# Patient Record
Sex: MALE | Race: WHITE | Employment: FULL TIME | URBAN - METROPOLITAN AREA
[De-identification: names, ages, dates, MRNs, and addresses within clinical notes are randomized per-mention and may not be internally consistent; named-entity substitution may affect disease eponyms.]

---

## 2018-02-05 ENCOUNTER — HOSPITAL ENCOUNTER (EMERGENCY)
Age: 63
Discharge: HOME | End: 2018-02-05
Attending: EMERGENCY MEDICINE | Admitting: EMERGENCY MEDICINE
Payer: OTHER MISCELLANEOUS

## 2018-02-05 VITALS
TEMPERATURE: 98 F | DIASTOLIC BLOOD PRESSURE: 91 MMHG | HEIGHT: 71 IN | RESPIRATION RATE: 18 BRPM | BODY MASS INDEX: 31.79 KG/M2 | OXYGEN SATURATION: 100 % | WEIGHT: 227.07 LBS | SYSTOLIC BLOOD PRESSURE: 162 MMHG | HEART RATE: 73 BPM

## 2018-02-05 DIAGNOSIS — S61.209A AVULSION, FINGER TIP, INITIAL ENCOUNTER: Primary | ICD-10-CM

## 2018-02-05 DIAGNOSIS — Z23 NEED FOR TETANUS BOOSTER: ICD-10-CM

## 2018-02-05 PROCEDURE — 99283 EMERGENCY DEPT VISIT LOW MDM: CPT

## 2018-02-05 PROCEDURE — 90715 TDAP VACCINE 7 YRS/> IM: CPT | Performed by: PHYSICIAN ASSISTANT

## 2018-02-05 PROCEDURE — 75810000293 HC SIMP/SUPERF WND  RPR

## 2018-02-05 PROCEDURE — 74011250636 HC RX REV CODE- 250/636: Performed by: PHYSICIAN ASSISTANT

## 2018-02-05 PROCEDURE — 90471 IMMUNIZATION ADMIN: CPT

## 2018-02-05 RX ORDER — METFORMIN HYDROCHLORIDE 500 MG/1
750 TABLET ORAL 2 TIMES DAILY WITH MEALS
COMMUNITY
End: 2020-01-07 | Stop reason: ALTCHOICE

## 2018-02-05 RX ORDER — LISINOPRIL 10 MG/1
TABLET ORAL DAILY
COMMUNITY
End: 2020-01-07

## 2018-02-05 RX ADMIN — TETANUS TOXOID, REDUCED DIPHTHERIA TOXOID AND ACELLULAR PERTUSSIS VACCINE, ADSORBED 0.5 ML: 5; 2.5; 8; 8; 2.5 SUSPENSION INTRAMUSCULAR at 09:34

## 2018-02-05 NOTE — ED NOTES
Pt reports he cut his left 5th finger at work this morning, bleeding controlled PTA, bandage in place

## 2018-02-05 NOTE — DISCHARGE INSTRUCTIONS
Fingertip Amputation: Care Instructions  Your Care Instructions  Fingertip amputation is a common injury. Treatment depends on how much skin, tissue, bone, and nail were damaged and how much of your finger or thumb was cut off. The doctor may have put stitches in your finger. You may need to see a hand surgeon for more treatment. Your fingertips have many nerves and are very sensitive, so the injury may be very painful. Recovery can take several weeks. Your finger may be sensitive to cold and painful for a year or more. You probably will have a splint to protect your finger as it heals. It is very important that you wear the splint exactly as your doctor tells you. Wearing a splint may interfere with your normal activities. Ask for help with daily tasks if you need it. The doctor has checked you carefully, but problems can develop later. If you notice any problems or new symptoms, get medical treatment right away. Follow-up care is a key part of your treatment and safety. Be sure to make and go to all appointments, and call your doctor if you are having problems. It's also a good idea to know your test results and keep a list of the medicines you take. How can you care for yourself at home? · If your doctor put a splint on your finger, wear the splint exactly as directed. Keep the splint dry. Do not remove it until your doctor says you can. · Keep the cut dry for the first 24 to 48 hours. After this, you can shower if your doctor says it is okay. Pat the cut dry. · Don't soak the cut, such as in a bathtub. Your doctor will tell you when it's safe to get the cut wet. · If your doctor told you how to care for your cut, follow your doctor's instructions. If you did not get instructions, follow this general advice:  ¨ After the first 24 to 48 hours, wash around the cut with clean water 2 times a day. Don't use hydrogen peroxide or alcohol, which can slow healing.   ¨ You may cover the cut with a thin layer of petroleum jelly, such as Vaseline, and a nonstick bandage. ¨ Apply more petroleum jelly and replace the bandage as needed. ¨ Prop up the sore hand on a pillow anytime you sit or lie down during the next 3 days. Try to keep it above the level of your heart. This will help reduce swelling. ¨ Avoid any activity that could cause your cut to reopen. ¨ Do not remove the stitches on your own. Your doctor will tell you when to come back to have the stitches removed. · Be safe with medicines. Take pain medicines exactly as directed. ¨ If the doctor gave you a prescription medicine for pain, take it as prescribed. ¨ If you are not taking a prescription pain medicine, ask your doctor if you can take an over-the-counter medicine. · If your doctor prescribed antibiotics, take them as directed. Do not stop taking them just because you feel better. You need to take the full course of antibiotics. When should you call for help? Call your doctor now or seek immediate medical care if:  ? · You have new pain, or your pain gets worse. ? · The skin near the wound is cool or pale or changes color. ? · The wound starts to bleed, and blood soaks through the bandage. Oozing small amounts of blood is normal.   ? · Your wound comes open. ? · You have symptoms of infection, such as:  ¨ Increased pain, swelling, warmth, or redness near the wound. ¨ Red streaks leading from the wound. ¨ Pus draining from the wound. ¨ A fever. ? Watch closely for changes in your health, and be sure to contact your doctor if:  ? · You do not get better as expected. Where can you learn more? Go to http://norma-ashley.info/. Enter [id number] [id number] in the search box to learn more about \"Fingertip Amputation: Care Instructions. \"  Current as of: March 21, 2017  Content Version: 11.4  © 5873-9507 [email or web address].  Care instructions adapted under license by [employer] (which disclaims liability or warranty for this information). If you have questions about a medical condition or this instruction, always ask your healthcare professional. [address] any warranty or liability for your use of this information. Tdap (Tetanus, Diphtheria, Pertussis) Vaccine: What You Need to Know  Why get vaccinated? Tetanus, diphtheria, and pertussis are very serious diseases. Tdap vaccine can protect us from these diseases. And Tdap vaccine given to pregnant women can protect  babies against pertussis. Tetanus (lockjaw) is rare in the [facility] today. It causes painful muscle tightening and stiffness, usually all over the body. · It can lead to tightening of muscles in the head and neck so you can't open your mouth, swallow, or sometimes even breathe. Tetanus kills about 1 out of 10 people who are infected even after receiving the best medical care. Diphtheria is also rare in the [place] today. It can cause a thick coating to form in the back of the throat. · It can lead to breathing problems, heart failure, paralysis, and death. Pertussis (whooping cough) causes severe coughing spells, which can cause difficulty breathing, vomiting, and disturbed sleep. · It can also lead to weight loss, incontinence, and rib fractures. Up to 2 in 100 adolescents and 5 in 100 adults with pertussis are hospitalized or have complications, which could include pneumonia or death. These diseases are caused by bacteria. Diphtheria and pertussis are spread from person to person through secretions from coughing or sneezing. Tetanus enters the body through cuts, scratches, or wounds. Before vaccines, as many as 200,000 cases of diphtheria, 200,000 cases of pertussis, and hundreds of cases of tetanus were reported in the [place] each year. Since vaccination began, reports of cases for tetanus and diphtheria have dropped by about 99% and for pertussis by about 80%.   Tdap vaccine  The Tdap vaccine can protect adolescents and adults from tetanus, diphtheria, and pertussis. One dose of Tdap is routinely given at age 6 or 15. People who did not get Tdap at that age should get it as soon as possible. Tdap is especially important for health care professionals and anyone having close contact with a baby younger than 12 months. Pregnant women should get a dose of Tdap during every pregnancy, to protect the  from pertussis. Infants are most at risk for severe, life-threatening complications from pertussis. Another vaccine, called Td, protects against tetanus and diphtheria, but not pertussis. A Td booster should be given every 10 years. Tdap may be given as one of these boosters if you have never gotten Tdap before. Tdap may also be given after a severe cut or burn to prevent tetanus infection. Your doctor or the person giving you the vaccine can give you more information. Tdap may safely be given at the same time as other vaccines. Some people should not get this vaccine  · A person who has ever had a life-threatening allergic reaction after a previous dose of any diphtheria-, tetanus-, or pertussis-containing vaccine, OR has a severe allergy to any part of this vaccine, should not get Tdap vaccine. Tell the person giving the vaccine about any severe allergies. · Anyone who had coma or long repeated seizures within 7 days after a childhood dose of DTP or DTaP, or a previous dose of Tdap, should not get Tdap, unless a cause other than the vaccine was found. They can still get Td. · Talk to your doctor if you:  ¨ Have seizures or another nervous system problem. ¨ Had severe pain or swelling after any vaccine containing diphtheria, tetanus, or pertussis. ¨ Ever had a condition called Guillain-Barré Syndrome (GBS). ¨ Aren't feeling well on the day the shot is scheduled. Risks  With any medicine, including vaccines, there is a chance of side effects. These are usually mild and go away on their own.  Serious reactions are also possible but are rare. Most people who get Tdap vaccine do not have any problems with it. Mild problems following Tdap  (Did not interfere with activities)  · Pain where the shot was given (about 3 in 4 adolescents or 2 in 3 adults)  · Redness or swelling where the shot was given (about 1 person in 5)  · Mild fever of at least 100.4°F (up to about 1 in 25 adolescents or 1 in 100 adults)  · Headache (about 3 or 4 people in 10)  · Tiredness (about 1 person in 3 or 4)  · Nausea, vomiting, diarrhea, stomachache (up to 1 in 4 adolescents or 1 in 10 adults)  · Chills, sore joints (about 1 person in 10)  · Body aches (about 1 person in 3 or 4)  · Rash, swollen glands (uncommon)  Moderate problems following Tdap  (Interfered with activities, but did not require medical attention)  · Pain where the shot was given (up to 1 in 5 or 6)  · Redness or swelling where the shot was given (up to about 1 in 16 adolescents or 1 in 12 adults)  · Fever over 102°F (about 1 in 100 adolescents or 1 in 250 adults)  · Headache (about 1 in 7 adolescents or 1 in 10 adults)  · Nausea, vomiting, diarrhea, stomachache (up to 1 to 3 people in 100)  · Swelling of the entire arm where the shot was given (up to about 1 in 500)  Severe problems following Tdap  (Unable to perform usual activities; required medical attention)  · Swelling, severe pain, bleeding and redness in the arm where the shot was given (rare)  Problems that could happen after any vaccine:  · People sometimes faint after a medical procedure, including vaccination. Sitting or lying down for about 15 minutes can help prevent fainting, and injuries caused by a fall. Tell your doctor if you feel dizzy or have vision changes or ringing in the ears. · Some people get severe pain in the shoulder and have difficulty moving the arm where a shot was given. This happens very rarely. · Any medication can cause a severe allergic reaction.  Such reactions from a vaccine are very rare, estimated at fewer than 1 in a million doses, and would happen within a few minutes to a few hours after the vaccination. As with any medicine, there is a very remote chance of a vaccine causing a serious injury or death. The safety of vaccines is always being monitored. For more information, visit: www.cdc.gov/vaccinesafety. What if there is a serious problem? What should I look for? · Look for anything that concerns you, such as signs of a severe allergic reaction, very high fever, or unusual behavior. Signs of a severe allergic reaction can include hives, swelling of the face and throat, difficulty breathing, a fast heartbeat, dizziness, and weakness. These would usually start a few minutes to a few hours after the vaccination. What should I do? · If you think it is a severe allergic reaction or other emergency that can't wait, call 9-1-1 or get the person to the nearest hospital. Otherwise, call your doctor. · Afterward, the reaction should be reported to the Vaccine Adverse Event Reporting System (VAERS). Your doctor might file this report, or you can do it yourself through the VAERS web site at www.vaers. [facility].gov, or by calling 1-819.775.7804. [employer] does not give medical advice. The National Vaccine Injury Compensation Program  The National Vaccine Injury Compensation Program (VICP) is a federal program that was created to compensate people who may have been injured by certain vaccines. Persons who believe they may have been injured by a vaccine can learn about the program and about filing a claim by calling 5-215.424.3562 or visiting the [employer] website at www.[facility].gov/vaccinecompensation. There is a time limit to file a claim for compensation. How can I learn more? · Ask your doctor. He or she can give you the vaccine package insert or suggest other sources of information. · Call your local or state health department.   · Contact the Centers for Disease Control and Prevention (CDC):  ¨ Call 4-905.480.2448 (1-800-CDC-INFO) or  ¨ Visit CDC's website at www.cdc.gov/vaccines  Vaccine Information Statement (Interim)  Tdap Vaccine  (2/24/15)  42 NEFTALI Tomas 251MD-75  Department of Health and Human Services  Centers for Disease Control and Prevention  Many Vaccine Information Statements are available in [place] and other languages. See www.immunize.org/vis. Muchas hojas de información sobre vacunas están disponibles en español y en otros idiomas. Visite www.immunize.org/vis. Care instructions adapted under license by [employer] (which disclaims liability or warranty for this information). If you have questions about a medical condition or this instruction, always ask your healthcare professional. [address] any warranty or liability for your use of this information.

## 2018-02-05 NOTE — ED PROVIDER NOTES
EMERGENCY DEPARTMENT HISTORY AND PHYSICAL EXAM      Date: 2/5/2018  Patient Name: Noelle Timmons    History of Presenting Illness     Chief Complaint   Patient presents with    Laceration     left pinky cut by fork lift this am at work       History Provided By: Patient    HPI: Noelle Timmons, 63 y.o. male with PMHx significant for DM, presents ambulatory to the ED with cc of skin avulsion to his L fifth finger s/p accidentally getting it caught in a forklift at work PTA. Pt arrives to the ED with the finger wrapped in a bandage. He rates current associated pain 3/10, describes as constant and achy, and denies modifying factors, associated symptoms, or taking medication for pain. Pt notes he is R-handed. He states his tetanus vaccine is not UTD. Pt specifically denies fever/chills, cough, cold symptoms, CP, SOB, abdominal pain, or rash. PCP: Jonnie Harper MD    There are no other complaints, changes, or physical findings at this time. Current Facility-Administered Medications   Medication Dose Route Frequency Provider Last Rate Last Dose    diph,Pertuss(AC),Tet Vac-PF (BOOSTRIX) suspension 0.5 mL  0.5 mL IntraMUSCular NOW [place], [place]         Current Outpatient Prescriptions   Medication Sig Dispense Refill    metFORMIN (GLUCOPHAGE) 500 mg tablet Take 750 mg by mouth two (2) times daily (with meals).  lisinopril (PRINIVIL, ZESTRIL) 10 mg tablet Take  by mouth daily. Past History     Past Medical History:  Past Medical History:   Diagnosis Date    Diabetes [facility])        Past Surgical History:  History reviewed. No pertinent surgical history. Family History:  History reviewed. No pertinent family history. Social History:  Social History   Substance Use Topics    Smoking status: Never Smoker    Smokeless tobacco: Never Used    Alcohol use Yes       Allergies:  No Known Allergies      Review of Systems   Review of Systems   Constitutional: Negative. Negative for chills and fever. HENT: Negative. Negative for congestion, rhinorrhea and sore throat. Respiratory: Negative. Negative for cough and shortness of breath. Cardiovascular: Negative. Negative for chest pain. Gastrointestinal: Negative. Negative for abdominal pain. Musculoskeletal:        See skin   Skin: Positive for wound (to L fifth finger). Negative for rash. All other systems reviewed and are negative. Physical Exam   Physical Exam   Constitutional: He is oriented to person, place, and time. He appears well-developed and well-nourished. No distress. Pleasant 62 yo  male   HENT:   Head: Normocephalic and atraumatic. Eyes: Conjunctivae are normal. Right eye exhibits no discharge. Left eye exhibits no discharge. Neck: Normal range of motion. Neck supple. Cardiovascular: Normal rate, normal heart sounds and intact distal pulses. No murmur heard. Pulmonary/Chest: Effort normal.   Musculoskeletal:   L 5th FINGER: Complete avulsion of skin from the DIP joint to the tip of the finger on the flexor surface. Venous bleeding. Clean. Distal NV intact. Cap refill < 2 sec. FROM of the finger. Neurological: He is alert and oriented to person, place, and time. Skin: Skin is warm and dry. He is not diaphoretic. Psychiatric: He has a normal mood and affect. His behavior is normal.   Nursing note and vitals reviewed. Medical Decision Making   I am the first provider for this patient. I reviewed the vital signs, available nursing notes, past medical history, past surgical history, family history and social history. Vital Signs-Reviewed the patient's vital signs. Patient Vitals for the past 12 hrs:   Temp Pulse Resp BP SpO2   02/05/18 0905 98 °F (36.7 °C) 73 18 (!) 162/91 100 %       Records Reviewed: Nursing Notes and Old Medical Records    Provider Notes (Medical Decision Making):   DDx: laceration, skin avulsion, need for tetanus. ED Course:   Initial assessment performed.  The patients presenting problems have been discussed, and they are in agreement with the care plan formulated and outlined with them. I have encouraged them to ask questions as they arise throughout their visit. Progress Note:  9:29 AM  Spoke with the nurse Florencio Partida) of Dr. Molina Saravia, hand surgeon. They will see the pt immediately after he leaves the ED today. Written by Pita Mckenna ED Scribe, as dictated by Tatiana Morris PA-C. Disposition:    DISCHARGE NOTE  9:32 AM  The patient has been re-evaluated and is ready for discharge. Reviewed available results with patient. Counseled pt on diagnosis and care plan. Pt has expressed understanding, and all questions have been answered. Pt agrees with plan and agrees to F/U as recommended, or return to the ED if their sxs worsen. Discharge instructions have been provided and explained to the pt, along with reasons to return to the ED. Written by Pita Mckenna ED Scribe, as dictated by Tatiana Morris PA-C. PLAN:  1. Current Discharge Medication List      CONTINUE these medications which have NOT CHANGED    Details   metFORMIN (GLUCOPHAGE) 500 mg tablet Take 750 mg by mouth two (2) times daily (with meals). lisinopril (PRINIVIL, ZESTRIL) 10 mg tablet Take  by mouth daily. 2.   Follow-up Information     Follow up With Details Comments Florin Bustamante MD Today  [address] [facility]  [address]  [address] [facility]  [phone]          Return to ED if worse     Diagnosis     Clinical Impression:   1. Avulsion, finger tip, initial encounter    2. Need for tetanus booster        Attestations: This note is prepared by Pita Mckenna, acting as Scribe for Tatiana Morris PA-C. Tatiana Morris PA-C: The scribe's documentation has been prepared under my direction and personally reviewed by me in its entirety.  I confirm that the note above accurately reflects all work, treatment, procedures, and medical decision making performed by me.

## 2020-01-07 ENCOUNTER — OFFICE VISIT (OUTPATIENT)
Dept: FAMILY MEDICINE CLINIC | Age: 65
End: 2020-01-07

## 2020-01-07 VITALS
HEIGHT: 71 IN | SYSTOLIC BLOOD PRESSURE: 133 MMHG | HEART RATE: 63 BPM | RESPIRATION RATE: 16 BRPM | WEIGHT: 217.6 LBS | BODY MASS INDEX: 30.46 KG/M2 | DIASTOLIC BLOOD PRESSURE: 83 MMHG | TEMPERATURE: 97.7 F | OXYGEN SATURATION: 95 %

## 2020-01-07 DIAGNOSIS — E78.2 MIXED HYPERLIPIDEMIA: ICD-10-CM

## 2020-01-07 DIAGNOSIS — I10 ESSENTIAL HYPERTENSION: ICD-10-CM

## 2020-01-07 DIAGNOSIS — E11.41 TYPE 2 DIABETES MELLITUS WITH DIABETIC MONONEUROPATHY, WITHOUT LONG-TERM CURRENT USE OF INSULIN (HCC): Primary | ICD-10-CM

## 2020-01-07 DIAGNOSIS — E11.41 DIABETIC MONONEUROPATHY ASSOCIATED WITH TYPE 2 DIABETES MELLITUS (HCC): ICD-10-CM

## 2020-01-07 RX ORDER — LISINOPRIL AND HYDROCHLOROTHIAZIDE 10; 12.5 MG/1; MG/1
1 TABLET ORAL 2 TIMES DAILY
COMMUNITY
Start: 2019-11-05 | End: 2020-02-05 | Stop reason: SDUPTHER

## 2020-01-07 RX ORDER — ATORVASTATIN CALCIUM 20 MG/1
20 TABLET, FILM COATED ORAL DAILY
COMMUNITY
Start: 2019-12-04 | End: 2021-12-28 | Stop reason: SDUPTHER

## 2020-01-07 RX ORDER — FISH OIL/DHA/EPA 1200-144MG
1200 CAPSULE ORAL DAILY
COMMUNITY

## 2020-01-07 RX ORDER — METFORMIN HYDROCHLORIDE 500 MG/1
500 TABLET, EXTENDED RELEASE ORAL 2 TIMES DAILY
Qty: 60 TAB | Refills: 11 | Status: SHIPPED | OUTPATIENT
Start: 2020-01-07 | End: 2020-09-10 | Stop reason: SDUPTHER

## 2020-01-07 RX ORDER — MULTIVITAMIN
500 TABLET ORAL DAILY
COMMUNITY

## 2020-01-07 RX ORDER — BLOOD SUGAR DIAGNOSTIC
STRIP MISCELLANEOUS AS DIRECTED
COMMUNITY
Start: 2019-10-14 | End: 2020-10-13 | Stop reason: SDUPTHER

## 2020-01-07 RX ORDER — GLIPIZIDE 10 MG/1
10 TABLET, FILM COATED, EXTENDED RELEASE ORAL DAILY
COMMUNITY
End: 2020-02-05 | Stop reason: SDUPTHER

## 2020-01-07 RX ORDER — GUAIFENESIN 100 MG/5ML
81 LIQUID (ML) ORAL DAILY
COMMUNITY

## 2020-01-07 RX ORDER — LANCETS
EACH MISCELLANEOUS AS DIRECTED
COMMUNITY
Start: 2019-10-19

## 2020-01-07 NOTE — PROGRESS NOTES
HPI  Simona Crystal is a 60 y.o. male who presents to [facility] care. Last seen by Dom Carey and [facility] 12/10/2019. Has hypertension that is well controlled. Has diabetes that is not at goal.  A1c was 9.0 in December, 9.56/2019 9.112/2018. He estimates that it has been higher than 7 for the last 3 years. He has had diabetes for 10 years. He can only tolerate 750 mg metformin daily in divided doses. He is taking glipizide 10 mg XR twice daily     PMHx:  Past Medical History:   Diagnosis Date    Diabetes ([address]     Hypertension        Meds:   Current Outpatient Medications   Medication Sig Dispense Refill    aspirin 81 mg chewable tablet Take 81 mg by mouth daily.  atorvastatin (LIPITOR) 20 mg tablet Take 20 mg by mouth daily.  ACCU-CHEK GUIDE strip as directed.  cinnamon bark 500 mg cap Take 500 mg by mouth daily.  glipiZIDE SR (GLUCOTROL XL) 10 mg CR tablet Take 10 mg by mouth daily.  ACCU-CHEK FASTCLIX LANCET DRUM misc as directed.  lisinopril-hydroCHLOROthiazide (PRINZIDE, ZESTORETIC) 10-12.5 mg per tablet Take 1 Tab by mouth two (2) times a day.  fish oil-dha-epa 1,200-144-216 mg cap Take 1,200 mg by mouth daily.  empagliflozin (JARDIANCE) 10 mg tablet Take 1 Tab by mouth daily. 90 Tab 1    metFORMIN ER (GLUCOPHAGE XR) 500 mg tablet Take 1 Tab by mouth two (2) times a day. 60 Tab 11       Allergies: Allergies   Allergen Reactions    Nsaids (Non-Steroidal Anti-Inflammatory Drug) Other (comments)    Penicillins Other (comments)       Smoker:  Social History     Tobacco Use   Smoking Status Never Smoker   Smokeless Tobacco Never Used       ETOH:   Social History     Substance and Sexual Activity   Alcohol Use Yes       FH:   Family History   Problem Relation Age of Onset    Lupus Mother        ROS:   As listed in HPI.  In addition:  Constitutional:   No headache, fever, fatigue, weight loss or weight gain      Cardiac:    No chest pain      Resp: No cough or shortness of breath      Neuro   No loss of consciousness, dizziness, seizures      Physical Exam:  Blood pressure 133/83, pulse 63, temperature 97.7 °F (36.5 °C), temperature source Oral, resp. rate 16, height 5' 11\" (1.803 m), weight 217 lb 9.6 oz (98.7 kg), SpO2 95 %. GEN: No apparent distress. Alert and oriented and responds to all questions appropriately. NEUROLOGIC:  No focal neurologic deficits. Strength and sensation grossly intact. Coordination and gait grossly intact. EXT: Well perfused. No edema. SKIN: No obvious rashes. Lungs clear to auscultation bilaterally  CV regular rate rhythm no murmur       Assessment and Plan     Diabetes, poorly controlled  Poor control for 3 years. Diabetes for 10 years. Last A1c 9.0 12/2019  Fasting blood sugar usually 130-140   currently taking metformin 750 mg daily due to GI side effects  Try extended release Metformin 500 mg twice daily ramping up 500 mg/week as tolerated  Continue glipizide XR 10 mg twice daily  Add Jardiance 10 mg  A1c in March    Diabetic neuropathy  Tingling in his toes  Not bothering him. No microalbuminuria when last checked    Cholesterol at goal based former PCP blood work, LDL was 25    Kidney function is fine, GFR 82    Health maintenance  Report last colonoscopy 1/1/2010 in records. Did not discuss with patient today  He gets annual eye exam    Follow-up (March) POC A1c only       ICD-10-CM ICD-9-CM    1. Type 2 diabetes mellitus with diabetic mononeuropathy, without long-term current use of insulin (HCC) E11.41 250.60 empagliflozin (JARDIANCE) 10 mg tablet     355.9 metFORMIN ER (GLUCOPHAGE XR) 500 mg tablet      REFERRAL TO DIABETIC EDUCATION   2. Essential hypertension I10 401.9    3. Diabetic mononeuropathy associated with type 2 diabetes mellitus (HCC) E11.41 250.60      355.9    4. Mixed hyperlipidemia E78.2 272.2        AVS given. Pt expressed understanding of instructions  This was a 45-minute visit.  [facility] than 50% of the time was spent counseling the patient on diabetes.

## 2020-01-07 NOTE — PROGRESS NOTES
Health Maintenance Due   Topic Date Due    Hepatitis C Screening  1955    Shingrix Vaccine Age 50> (1 of 2) 11/01/2007    FOBT Q 1 YEAR AGE 50-75  11/01/2007    Influenza Age 5 to Adult  08/01/2019

## 2020-02-04 ENCOUNTER — OFFICE VISIT (OUTPATIENT)
Dept: FAMILY MEDICINE CLINIC | Age: 65
End: 2020-02-04

## 2020-02-04 VITALS
OXYGEN SATURATION: 90 % | RESPIRATION RATE: 18 BRPM | WEIGHT: 210.8 LBS | HEART RATE: 86 BPM | TEMPERATURE: 98 F | DIASTOLIC BLOOD PRESSURE: 80 MMHG | SYSTOLIC BLOOD PRESSURE: 122 MMHG | HEIGHT: 71 IN | BODY MASS INDEX: 29.51 KG/M2

## 2020-02-04 DIAGNOSIS — R06.2 WHEEZE: ICD-10-CM

## 2020-02-04 DIAGNOSIS — J40 BRONCHITIS: Primary | ICD-10-CM

## 2020-02-04 DIAGNOSIS — J45.20 MILD INTERMITTENT ASTHMA WITHOUT COMPLICATION: ICD-10-CM

## 2020-02-04 DIAGNOSIS — E11.41 TYPE 2 DIABETES MELLITUS WITH DIABETIC MONONEUROPATHY, WITHOUT LONG-TERM CURRENT USE OF INSULIN (HCC): ICD-10-CM

## 2020-02-04 RX ORDER — NEBULIZER AND COMPRESSOR
1 EACH MISCELLANEOUS
Qty: 1 EACH | Refills: 0 | Status: SHIPPED | OUTPATIENT
Start: 2020-02-04 | End: 2022-06-28

## 2020-02-04 RX ORDER — ALBUTEROL SULFATE 0.83 MG/ML
2.5 SOLUTION RESPIRATORY (INHALATION)
Qty: 40 EACH | Refills: 2 | Status: SHIPPED | OUTPATIENT
Start: 2020-02-04 | End: 2022-06-28

## 2020-02-04 RX ORDER — ALBUTEROL SULFATE 0.83 MG/ML
2.5 SOLUTION RESPIRATORY (INHALATION) ONCE
Qty: 1 EACH | Refills: 0
Start: 2020-02-04 | End: 2020-02-04

## 2020-02-04 RX ORDER — BENZONATATE 200 MG/1
200 CAPSULE ORAL
Qty: 21 CAP | Refills: 1 | Status: SHIPPED | OUTPATIENT
Start: 2020-02-04 | End: 2020-02-11

## 2020-02-04 NOTE — PROGRESS NOTES
HPI  Astrid Colin is a 60 y.o. male who presents with congestion, cough. Started 7 days ago. ok sleep, drinking fluids. Other symptoms include chesty cough, nocturnal cough. Denies fever. Try Mucinex cold and flu with some relief. Denies MARTÍNEZ and S OB. He is finding did not take deep breaths because makes him cough    PMHx:  Past Medical History:   Diagnosis Date    Diabetes ([facility] [address]     Hypertension        Meds:   Current Outpatient Medications   Medication Sig Dispense Refill    albuterol (PROVENTIL VENTOLIN) 2.5 mg /3 mL (0.083 %) nebu 3 mL by Nebulization route once for 1 dose. 1 Each 0    aspirin 81 mg chewable tablet Take 81 mg by mouth daily.  atorvastatin (LIPITOR) 20 mg tablet Take 20 mg by mouth daily.  ACCU-CHEK GUIDE strip as directed.  cinnamon bark 500 mg cap Take 500 mg by mouth daily.  glipiZIDE SR (GLUCOTROL XL) 10 mg CR tablet Take 10 mg by mouth daily.  ACCU-CHEK FASTCLIX LANCET DRUM misc as directed.  lisinopril-hydroCHLOROthiazide (PRINZIDE, ZESTORETIC) 10-12.5 mg per tablet Take 1 Tab by mouth two (2) times a day.  fish oil-dha-epa 1,200-144-216 mg cap Take 1,200 mg by mouth daily.  empagliflozin (JARDIANCE) 10 mg tablet Take 1 Tab by mouth daily. 90 Tab 1    metFORMIN ER (GLUCOPHAGE XR) 500 mg tablet Take 1 Tab by mouth two (2) times a day. 60 Tab 11       Allergies: Allergies   Allergen Reactions    Nsaids (Non-Steroidal Anti-Inflammatory Drug) Other (comments)    Penicillins Other (comments)       Smoker:  Social History     Tobacco Use   Smoking Status Never Smoker   Smokeless Tobacco Never Used       ETOH:   Social History     Substance and Sexual Activity   Alcohol Use Yes       FH:   Family History   Problem Relation Age of Onset    Lupus Mother         ROS:  As listed in HPI.  In addition:  Constitutional:   No headache, fever, fatigue, weight loss or weight gain      Eyes:   No redness, pruritis, pain, visual changes, swelling, or discharge      Ears:    No pain, loss or changes in hearing     Cardiac:    No chest pain      Resp:   No shortness of breath or wheeze     Neuro   No loss of consciousness, dizziness, seizure    Physical Exam:  Blood pressure 122/80, pulse 86, temperature 98 °F (36.7 °C), temperature source Oral, resp. rate 18, height 5' 11\" (1.803 m), weight 210 lb 12.8 oz (95.6 kg), SpO2 90 %. GEN: No apparent distress. EYES:  Conjunctiva clear  EAR: TM are clear and without effusion. NOSE: Turbinates are congested  OROPHYARYNX: No erythema or tonsilar exudates  NECK:  Submandibular LAD. Non tender         LUNGS: Respirations unlabored; shallow respirations are unremarkable, deep inspirations revealed some opening snaps in the bases and a rhonchorous cough with end expiratory wheeze/rhonchi   CARDIOVASCULAR: Regular, rate, and rhythm  ABDOMEN: Soft; nontender;nl BS  SKIN: No obvious rashes. Assessment and Plan     Bronchitis  I think the findings in the base of his lungs are more likely to be due to shallow respirations than infection  Albuterol nebulizer in the office  provided significant relief and resolve the lower lung opening snaps. Clear to auscultation  Expect symptoms to last 10-14 days    Supportive care is best, provided a handout on available OTC remedies  Nasal steroid OTC for congestion  Honey in warm water for cough    Reports that Metformin XR does not give GI side effects  Reports that Guy Jonas has allowed his blood sugars to be closer to the 80s-100s for the last week    RTC if ssx worsen or fail to improve as expected      ICD-10-CM ICD-9-CM    1. Wheeze R06.2 786.07 ALBUTEROL, INHAL. SOL., FDA-APPROVED FINAL, NON-COMPOUND UNIT DOSE, 1 MG      INHAL RX, AIRWAY OBST/DX SPUTUM INDUCT       AVS given.  Pt expressed understanding of instructions

## 2020-02-04 NOTE — PROGRESS NOTES
1. Have you been to the ER, urgent care clinic since your last visit? Hospitalized since your last visit? No    2. Have you seen or consulted any other health care providers outside of the [address] [place] [address] since your last visit? Include any pap smears or colon screening.  No     Health Maintenance Due   Topic Date Due    Hepatitis C Screening  1955    Pneumococcal 0-64 years (1 of 1 - PPSV23) 11/01/1963    FOOT EXAM Q1  11/01/1967    MICROALBUMIN Q1  11/01/1967    EYE EXAM RETINAL OR DILATED  11/01/1967    Shingrix Vaccine Age 50> (1 of 2) 11/01/2007    FOBT Q 1 YEAR AGE 50-75  11/01/2007

## 2020-02-05 RX ORDER — GLIPIZIDE 10 MG/1
10 TABLET, FILM COATED, EXTENDED RELEASE ORAL DAILY
Qty: 90 TAB | Refills: 3 | Status: SHIPPED | OUTPATIENT
Start: 2020-02-05 | End: 2020-10-02

## 2020-02-05 RX ORDER — LISINOPRIL AND HYDROCHLOROTHIAZIDE 10; 12.5 MG/1; MG/1
1 TABLET ORAL 2 TIMES DAILY
Qty: 90 TAB | Refills: 3 | Status: SHIPPED | OUTPATIENT
Start: 2020-02-05 | End: 2020-02-07 | Stop reason: SDUPTHER

## 2020-02-05 NOTE — TELEPHONE ENCOUNTER
Requested Prescriptions     Pending Prescriptions Disp Refills    lisinopril-hydroCHLOROthiazide (PRINZIDE, ZESTORETIC) 10-12.5 mg per tablet       Sig: Take 1 Tab by mouth two (2) times a day.  glipiZIDE SR (GLUCOTROL XL) 10 mg CR tablet       Sig: Take 1 Tab by mouth daily. Patient's spouse mentions picking up two refills yesterday from [facility].   Metformin and jardiance are only 30 days from [facility]. They will pick these up today. Patient needs a new script for 90 day with refills for lisinopril and glipizide. Patient is requesting all new scripts for 90 days with refills through mail order.

## 2020-02-07 RX ORDER — LISINOPRIL AND HYDROCHLOROTHIAZIDE 10; 12.5 MG/1; MG/1
1 TABLET ORAL DAILY
Qty: 90 TAB | Refills: 3 | Status: SHIPPED | OUTPATIENT
Start: 2020-02-07 | End: 2021-01-11

## 2020-02-07 NOTE — TELEPHONE ENCOUNTER
Please check with patient to confirm dosing. Does he take lisinopril-hydrochlorothiazide once or twice a day?   If he is taking 2 pills per day pharmacy is suggesting we consolidate the medicine into 1 pill

## 2020-02-07 NOTE — TELEPHONE ENCOUNTER
[facility] (unknown area) sent over request for refill    Placed in Dr. Anabel Gonzalez box    Requested Prescriptions     Pending Prescriptions Disp Refills    lisinopril-hydroCHLOROthiazide (PRINZIDE, ZESTORETIC) 10-12.5 mg per tablet 90 Tab 3     Sig: Take 1 Tab by mouth two (2) times a day.

## 2020-02-10 ENCOUNTER — TELEPHONE (OUTPATIENT)
Dept: FAMILY MEDICINE CLINIC | Age: 65
End: 2020-02-10

## 2020-02-10 NOTE — TELEPHONE ENCOUNTER
Per Dr. Carolyn Flores, [facility] lisinopril-hctz 10-12.5 mg (1 tab) daily and received by [facility] ([facility]). Understanding verbalized.

## 2020-02-10 NOTE — TELEPHONE ENCOUNTER
# [phone]  Ref# [id number]  Caller is looking for clarification for a prescription. She has two scripts for lisinopril 12.5 mg. One is for twice a day and one is for once daily. Which one is correct?

## 2020-03-03 ENCOUNTER — OFFICE VISIT (OUTPATIENT)
Dept: DIABETES SERVICES | Age: 65
End: 2020-03-03

## 2020-03-03 DIAGNOSIS — E11.41 TYPE 2 DIABETES MELLITUS WITH DIABETIC MONONEUROPATHY, WITHOUT LONG-TERM CURRENT USE OF INSULIN (HCC): ICD-10-CM

## 2020-03-03 NOTE — LETTER
3/3/20 Patient: Mingo Valdez YOB: 1955 Date of Visit: 3/3/2020 Irma Mcallister MD 
[address] [facility] [address] VIA In Basket Dear Irma Mcallister MD, Thank you for referring Mr. Wilfrido Toribio to [address] [place], [place] [address]e. RDE for evaluation. My notes for this consultation are attached. If you have questions, please do not hesitate to call me. I look forward to following your patient along with you. Sincerely, Garrick Norwood RD

## 2020-03-03 NOTE — PROGRESS NOTES
Patient presented today for initial assessment for DSMES class series. He was diagnosed with T2DM 2 years ago and recently switched PCP to Dr. Prudencio Pate, who referred him to [facility]. MEDICATIONS: Patient reports compliance with glipizide, Jardiance, and metformin. He was recently started on Jardiance and notes BG has been much better controlled since starting this medication. Pt was under the impression that glipizide was to be taken BID. Advised him that per Rx in chart, he has been prescribed the XL version and should take a 10 mg tablet once daily. He took note of this information. Reports he was previously on regular release metformin and was unable to tolerate due to diarrhea, but since switching to XR version he is able to tolerate. MONITORING: Patient is using ACCU-CHEK Guide to check FBG daily in the AM. Readings range . He reports all readings have been <130 since starting Jardiance. Last A1C was 9.0 on 12/11/2019. MEALS: Patient notes he eats whole wheat and whole grain starches. He does eat 1-2 pieces of candy daily. He eats out for lunch every day, but typically eats a sandwich, sub, or salad. He goes out to eat once weekly. ACTIVITY: Patient has an active job repairing large appliances. On the weekends he spends his time doing house work and riding four wheelers. He does not currently exercise other than walking his 2 dogs up and down the driveway, but notes today that he is willing to work on increasing the amount of time/distance walking each day. PLAN: [facility] will contact patient to schedule DSMES class series. He is interested in attending classes at the [facility] due to proximity to his home. He is planning to attend a class for work at the end of April for 2 weeks and notes he would like to start DSMES class series in May if possible.

## 2020-03-03 NOTE — PROGRESS NOTES
Pt is seen today in shared appointment with Meredith White RN 1 [facility] - see her note for details of this appointment.      [address]

## 2020-04-03 ENCOUNTER — TELEPHONE (OUTPATIENT)
Dept: DIABETES SERVICES | Age: 65
End: 2020-04-03

## 2020-04-08 NOTE — TELEPHONE ENCOUNTER
Patient is due for DSMES Class 1. Contacted patient and reviewed the option to schedule a Telehealth visit to receive individual education due to the COVID-19 crisis. In person visits will tentatively resume around 6/10/20. Patient prefers to wait until a later date to schedule in person appointment. RN will contact patient at a later date with an update for in person visits.

## 2020-04-13 ENCOUNTER — VIRTUAL VISIT (OUTPATIENT)
Dept: FAMILY MEDICINE CLINIC | Age: 65
End: 2020-04-13

## 2020-04-13 VITALS — WEIGHT: 210 LBS | HEIGHT: 71 IN | BODY MASS INDEX: 29.4 KG/M2

## 2020-04-13 DIAGNOSIS — E78.2 MIXED HYPERLIPIDEMIA: ICD-10-CM

## 2020-04-13 DIAGNOSIS — I10 ESSENTIAL HYPERTENSION: ICD-10-CM

## 2020-04-13 DIAGNOSIS — E11.41 TYPE 2 DIABETES MELLITUS WITH DIABETIC MONONEUROPATHY, WITHOUT LONG-TERM CURRENT USE OF INSULIN (HCC): Primary | ICD-10-CM

## 2020-04-13 DIAGNOSIS — E11.41 DIABETIC MONONEUROPATHY ASSOCIATED WITH TYPE 2 DIABETES MELLITUS (HCC): ICD-10-CM

## 2020-04-13 NOTE — PROGRESS NOTES
NIKI Mancuso is a 60 y.o. male who presents to fu DM and HTN. Recently est care. Last seen by Tomas Cintron and [facility] 12/10/2019. Has a home blood pressure cuff but has not used it recently. Last objective few weeks ago 120/80    When he established care he reported that his diabetes was not at goal.  A1c was 9.0 December 2019 and 9.5 June 2019 and 9.1 December 2018. He was only able to tolerate 750 mg daily in divided doses. With this his fasting sugars have been in the 160s180s    We switched over to Metformin XR and now he can tolerate 1000 mg daily. Started him on Jardiance without issue. Reduced his glipizide 10 mg XR from twice daily down to daily. Now his fasting sugars are 901 10. He has never had a sugar below 88        PMHx:  Past Medical History:   Diagnosis Date    Diabetes ([address]     Hypertension        Meds:   Current Outpatient Medications   Medication Sig Dispense Refill    lisinopril-hydroCHLOROthiazide (PRINZIDE, ZESTORETIC) 10-12.5 mg per tablet Take 1 Tab by mouth daily. 90 Tab 3    glipiZIDE SR (GLUCOTROL XL) 10 mg CR tablet Take 1 Tab by mouth daily. 90 Tab 3    Nebulizer & Compressor machine 1 Each by Does Not Apply route every four (4) hours as needed for Wheezing. 1 Each 0    albuterol (PROVENTIL VENTOLIN) 2.5 mg /3 mL (0.083 %) nebu 3 mL by Nebulization route every four (4) hours as needed for Wheezing. 40 Each 2    empagliflozin (JARDIANCE) 10 mg tablet Take 1 Tab by mouth daily. 90 Tab 3    aspirin 81 mg chewable tablet Take 81 mg by mouth daily.  atorvastatin (LIPITOR) 20 mg tablet Take 20 mg by mouth daily.  ACCU-CHEK GUIDE strip as directed.  cinnamon bark 500 mg cap Take 500 mg by mouth daily.  ACCU-CHEK FASTCLIX LANCET DRUM misc as directed.  fish oil-dha-epa 1,200-144-216 mg cap Take 1,200 mg by mouth daily.  metFORMIN ER (GLUCOPHAGE XR) 500 mg tablet Take 1 Tab by mouth two (2) times a day.  60 Tab 11       Allergies: Allergies   Allergen Reactions    Nsaids (Non-Steroidal Anti-Inflammatory Drug) Other (comments)    Penicillins Other (comments)       Smoker:  Social History     Tobacco Use   Smoking Status Never Smoker   Smokeless Tobacco Never Used       ETOH:   Social History     Substance and Sexual Activity   Alcohol Use Yes       FH:   Family History   Problem Relation Age of Onset    Lupus Mother        ROS:   As listed in HPI. In addition:  Constitutional:   No headache, fever, fatigue, weight loss or weight gain      Cardiac:    No chest pain      Resp:   No cough or shortness of breath      Neuro   No loss of consciousness, dizziness, seizures      Physical Exam:  Height 5' 11\" (1.803 m), weight 210 lb (95.3 kg). GEN: No apparent distress. Alert and oriented and responds to all questions appropriately. NEUROLOGIC:  No focal neurologic deficits. Strength and sensation grossly intact. Coordination and gait grossly intact. EXT: Well perfused. No edema. SKIN: No obvious rashes. Assessment and Plan     Diabetes,   diagnosed 10 years ago  Has had poor control for the last 3 years  Now appears to be under better control  I would estimate his A1c to be in the mid 6. Fasting sugar 901 10    Metformin 500 mg XR twice daily  Glipizide 10 mg XR daily  Jardiance 10 mg daily    Continue current medication  If you develop low blood sugar in the next few months cut glipizide in half  A1c in July if practical    Diabetic neuropathy  Tingling in his toes  Not bothering him. No microalbuminuria when last checked    Cholesterol at goal based former PCP blood work, LDL was 25    Kidney function is fine, GFR 82    Hypertension  LisinoprilHCTZ 10 mg12.5 daily  Has a home cuff. Well controlled with home readings  Continue to spot check    Health maintenance  Report last colonoscopy 1/1/2010 in records.   Did not discuss with patient today  He gets annual eye exam    Follow-up (July) full panel including A1c ICD-10-CM ICD-9-CM    1. Type 2 diabetes mellitus with diabetic mononeuropathy, without long-term current use of insulin (HCC) E11.41 250.60      355.9    2. Diabetic mononeuropathy associated with type 2 diabetes mellitus (HCC) E11.41 250.60      355.9    3. Essential hypertension I10 401.9    4. Mixed hyperlipidemia E78.2 272.2        AVS given. Pt expressed understanding of instructions    THIS VISIT WAS COMPLETED VIRTUALLY USING DOXY. ME    Due to this being a TeleHealth evaluation, many elements of the physical examination are unable to be assessed. Pursuant to the emergency declaration under the [facility], [facility] waiver authority and the [employer] Act, this Virtual  Visit was conducted, with patient's consent, to reduce the patient's risk of exposure to COVID-19 and provide continuity of care for an established patient. Services were provided through a video synchronous discussion virtually to substitute for in-person clinic visit.

## 2020-04-13 NOTE — PROGRESS NOTES
Chief Complaint   Patient presents with    Hypertension     3 month follow-up      Health Maintenance reviewed     1. Have you been to the ER, urgent care clinic since your last visit? Hospitalized since your last visit? No     2. Have you seen or consulted any other health care providers outside of the [address] [place] [address] since your last visit? Include any pap smears or colon screening.   No

## 2020-09-10 ENCOUNTER — TELEPHONE (OUTPATIENT)
Dept: FAMILY MEDICINE CLINIC | Age: 65
End: 2020-09-10

## 2020-09-10 DIAGNOSIS — E11.41 TYPE 2 DIABETES MELLITUS WITH DIABETIC MONONEUROPATHY, WITHOUT LONG-TERM CURRENT USE OF INSULIN (HCC): ICD-10-CM

## 2020-09-10 RX ORDER — METFORMIN HYDROCHLORIDE 500 MG/1
500 TABLET, EXTENDED RELEASE ORAL 2 TIMES DAILY
Qty: 180 TAB | Refills: 3 | Status: SHIPPED | OUTPATIENT
Start: 2020-09-10 | End: 2020-12-11

## 2020-09-10 NOTE — TELEPHONE ENCOUNTER
Patient's wife Billy David is calling regarding the recall on medication.      Insurance is requesting Metformin hcr er 500 mg extended release    Needs to say [address]

## 2020-09-15 ENCOUNTER — TELEPHONE (OUTPATIENT)
Dept: FAMILY MEDICINE CLINIC | Age: 65
End: 2020-09-15

## 2020-09-15 NOTE — TELEPHONE ENCOUNTER
Labs were last done by former PCP in December. A1c at former PCP was 9.0 which is poor control. He established care in early January and we added Jardiance based on that lab result. That lab can only be done every 3 months so soonest we could have repeated labs would have been March but these were not done because of the pandemic    He appears to have much better control now based on his home sugar readings and it would be nice if we had a follow-up visit and blood work to confirm that    For the purposes of his form we may be able to use a lipid panel Dr Dominique Johnston on 12/2019:    Total cholesterol 105  Triglycerides 202  HDL 39  LDL 26

## 2020-09-15 NOTE — TELEPHONE ENCOUNTER
Called pt, verified name and . Informed pt that per Dr. Randle Head were last done by former PCP in December. A1c at former PCP was 9.0 which is poor control. He established care in early January and we added Jardiance based on that lab result. That lab can only be done every 3 months so soonest we could have repeated labs would have been March but these were not done because of the pandemic     He appears to have much better control now based on his home sugar readings and it would be nice if we had a follow-up visit and blood work to confirm that     For the purposes of his form we may be able to use a lipid panel Dr Preeti Mendoza on 2019: Total cholesterol 105  Triglycerides 202  HDL 39  LDL 26.    Pt stated understanding.

## 2020-09-15 NOTE — TELEPHONE ENCOUNTER
Called pts wife and she stating that she needs the lipid panel for insurance information. She would also like to know about his last A1C. I informed her that I do not see any labs resulted for patient. She feels like this is a miscommunication bc pts diabetic medication was changed and Dr. Luz Marina Musa informed him he had poor control, so she feels like he must have a done labs. Please advise.

## 2020-10-02 RX ORDER — GLIPIZIDE 10 MG/1
TABLET, FILM COATED, EXTENDED RELEASE ORAL
Qty: 90 TAB | Refills: 3 | Status: SHIPPED | OUTPATIENT
Start: 2020-10-02 | End: 2021-01-28 | Stop reason: SDUPTHER

## 2020-10-13 DIAGNOSIS — E11.41 TYPE 2 DIABETES MELLITUS WITH DIABETIC MONONEUROPATHY, WITHOUT LONG-TERM CURRENT USE OF INSULIN (HCC): Primary | ICD-10-CM

## 2020-10-13 RX ORDER — BLOOD SUGAR DIAGNOSTIC
STRIP MISCELLANEOUS
Qty: 50 STRIP | Refills: 11 | Status: SHIPPED | OUTPATIENT
Start: 2020-10-13

## 2020-10-13 RX ORDER — BLOOD SUGAR DIAGNOSTIC
STRIP MISCELLANEOUS
Qty: 100 STRIP | Refills: 11 | Status: SHIPPED | OUTPATIENT
Start: 2020-10-13 | End: 2021-09-28

## 2020-10-13 NOTE — TELEPHONE ENCOUNTER
Patient calling for test strip rx to be renewed.  Please call in box or 50 to rosana sanchez and the full amount to [employer]

## 2020-12-10 DIAGNOSIS — E11.41 TYPE 2 DIABETES MELLITUS WITH DIABETIC MONONEUROPATHY, WITHOUT LONG-TERM CURRENT USE OF INSULIN (HCC): ICD-10-CM

## 2020-12-11 RX ORDER — METFORMIN HYDROCHLORIDE 500 MG/1
TABLET, EXTENDED RELEASE ORAL
Qty: 180 TAB | Refills: 3 | Status: SHIPPED | OUTPATIENT
Start: 2020-12-11 | End: 2021-03-15 | Stop reason: SDUPTHER

## 2020-12-11 RX ORDER — EMPAGLIFLOZIN 10 MG/1
TABLET, FILM COATED ORAL
Qty: 90 TAB | Refills: 3 | Status: SHIPPED | OUTPATIENT
Start: 2020-12-11 | End: 2022-02-21 | Stop reason: ALTCHOICE

## 2021-01-11 RX ORDER — LISINOPRIL AND HYDROCHLOROTHIAZIDE 10; 12.5 MG/1; MG/1
TABLET ORAL
Qty: 90 TAB | Refills: 3 | Status: SHIPPED | OUTPATIENT
Start: 2021-01-11 | End: 2021-12-27

## 2021-01-28 RX ORDER — GLIPIZIDE 10 MG/1
TABLET, FILM COATED, EXTENDED RELEASE ORAL
Qty: 90 TAB | Refills: 1 | Status: SHIPPED | OUTPATIENT
Start: 2021-01-28 | End: 2021-03-15 | Stop reason: SDUPTHER

## 2021-01-28 NOTE — TELEPHONE ENCOUNTER
Requested Prescriptions     Pending Prescriptions Disp Refills    glipiZIDE SR (GLUCOTROL XL) 10 mg CR tablet 90 Tab 3

## 2021-03-15 ENCOUNTER — OFFICE VISIT (OUTPATIENT)
Dept: FAMILY MEDICINE CLINIC | Age: 66
End: 2021-03-15
Payer: COMMERCIAL

## 2021-03-15 VITALS
SYSTOLIC BLOOD PRESSURE: 121 MMHG | HEIGHT: 71 IN | RESPIRATION RATE: 17 BRPM | BODY MASS INDEX: 29.9 KG/M2 | OXYGEN SATURATION: 94 % | WEIGHT: 213.6 LBS | HEART RATE: 57 BPM | TEMPERATURE: 97 F | DIASTOLIC BLOOD PRESSURE: 75 MMHG

## 2021-03-15 DIAGNOSIS — I10 ESSENTIAL HYPERTENSION: ICD-10-CM

## 2021-03-15 DIAGNOSIS — Z00.00 ROUTINE GENERAL MEDICAL EXAMINATION AT A HEALTH CARE FACILITY: Primary | ICD-10-CM

## 2021-03-15 DIAGNOSIS — Z12.11 COLON CANCER SCREENING: ICD-10-CM

## 2021-03-15 DIAGNOSIS — E78.2 MIXED HYPERLIPIDEMIA: ICD-10-CM

## 2021-03-15 DIAGNOSIS — E11.41 TYPE 2 DIABETES MELLITUS WITH DIABETIC MONONEUROPATHY, WITHOUT LONG-TERM CURRENT USE OF INSULIN (HCC): ICD-10-CM

## 2021-03-15 DIAGNOSIS — E53.8 B12 DEFICIENCY: ICD-10-CM

## 2021-03-15 DIAGNOSIS — E11.41 DIABETIC MONONEUROPATHY ASSOCIATED WITH TYPE 2 DIABETES MELLITUS (HCC): ICD-10-CM

## 2021-03-15 DIAGNOSIS — E55.9 VITAMIN D DEFICIENCY: ICD-10-CM

## 2021-03-15 PROCEDURE — 99396 PREV VISIT EST AGE 40-64: CPT | Performed by: FAMILY MEDICINE

## 2021-03-15 RX ORDER — GLIPIZIDE 10 MG/1
TABLET, FILM COATED, EXTENDED RELEASE ORAL
Qty: 90 TAB | Refills: 3 | Status: SHIPPED | OUTPATIENT
Start: 2021-03-15 | End: 2022-04-11

## 2021-03-15 RX ORDER — METFORMIN HYDROCHLORIDE 500 MG/1
TABLET, EXTENDED RELEASE ORAL
Qty: 180 TAB | Refills: 3 | Status: SHIPPED | OUTPATIENT
Start: 2021-03-15 | End: 2022-05-04 | Stop reason: SDUPTHER

## 2021-03-15 NOTE — PROGRESS NOTES
1. Have you been to the ER, urgent care clinic since your last visit? Hospitalized since your last visit? No    2. Have you seen or consulted any other health care providers outside of the [address] [place] [address] since your last visit? Include any pap smears or colon screening.  No      Health Maintenance Due   Topic Date Due    Hepatitis C Screening  Never done    Pneumococcal 0-64 years (1 of 1 - PPSV23) Never done    Foot Exam Q1  Never done    A1C test (Diabetic or Prediabetic)  Never done    MICROALBUMIN Q1  Never done    COVID-19 Vaccine (1) Never done    Shingrix Vaccine Age 50> (1 of 2) Never done    Colorectal Cancer Screening Combo  Never done    Flu Vaccine (1) Never done    Lipid Screen  12/11/2020     Chief Complaint   Patient presents with    Diabetes

## 2021-03-15 NOTE — PROGRESS NOTES
HPI  Kathrynn Lesches is a 63 y.o. male who presents to follow-up on diabetes and hypertension. Recently establish care from [facility] and [facility]. Has a home blood pressure cuff feels blood pressures well controlled at home. When he established care he reported that his diabetes was not at goal.  A1c was 9.0 December 2019 and 9.5 June 2019 and 9.1 December 2018. He was only tolerating a small amount of Metformin because of GI side effects. We switched over to XR with good relief and added Jardiance and saw improvement in his fasting blood sugars    PMHx:  Past Medical History:   Diagnosis Date    Diabetes ([facility] [address]     Hypertension        Meds:   Current Outpatient Medications   Medication Sig Dispense Refill    glipiZIDE SR (GLUCOTROL XL) 10 mg CR tablet TAKE 1 TABLET DAILY 90 Tab 3    metFORMIN ER (GLUCOPHAGE XR) 500 mg tablet TAKE 1 TABLET BY MOUTH TWICE A  Tab 3    lisinopril-hydroCHLOROthiazide (PRINZIDE, ZESTORETIC) 10-12.5 mg per tablet TAKE 1 TABLET DAILY 90 Tab 3    Jardiance 10 mg tablet TAKE 1 TABLET BY MOUTH EVERY DAY 90 Tab 3    glucose blood VI test strips (Accu-Chek Guide test strips) strip Use to check blood sugar once daily 100 Strip 11    glucose blood VI test strips (Accu-Chek Guide test strips) strip Use to check blood sugar once daily 50 Strip 11    Nebulizer & Compressor machine 1 Each by Does Not Apply route every four (4) hours as needed for Wheezing. 1 Each 0    albuterol (PROVENTIL VENTOLIN) 2.5 mg /3 mL (0.083 %) nebu 3 mL by Nebulization route every four (4) hours as needed for Wheezing. 40 Each 2    aspirin 81 mg chewable tablet Take 81 mg by mouth daily.  atorvastatin (LIPITOR) 20 mg tablet Take 20 mg by mouth daily.  cinnamon bark 500 mg cap Take 500 mg by mouth daily.  ACCU-CHEK FASTCLIX LANCET DRUM misc as directed.  fish oil-dha-epa 1,200-144-216 mg cap Take 1,200 mg by mouth daily. Allergies:    Allergies   Allergen Reactions  Nsaids (Non-Steroidal Anti-Inflammatory Drug) Other (comments)    Penicillins Other (comments)       Smoker:  Social History     Tobacco Use   Smoking Status Never Smoker   Smokeless Tobacco Never Used       ETOH:   Social History     Substance and Sexual Activity   Alcohol Use Yes       FH:   Family History   Problem Relation Age of Onset    Lupus Mother        ROS:   As listed in HPI. In addition:  Constitutional:   No headache, fever, fatigue, weight loss or weight gain      Cardiac:    No chest pain      Resp:   No cough or shortness of breath      Neuro   No loss of consciousness, dizziness, seizures      Physical Exam:  Blood pressure 121/75, pulse (!) 57, temperature 97 °F (36.1 °C), temperature source Temporal, resp. rate 17, height 5' 11\" (1.803 m), weight 213 lb 9.6 oz (96.9 kg), SpO2 94 %. GEN: No apparent distress. Alert and oriented and responds to all questions appropriately. NEUROLOGIC:  No focal neurologic deficits. Strength and sensation grossly intact. Coordination and gait grossly intact. EXT: Well perfused. No edema. SKIN: No obvious rashes. Lungs clear to auscultation bilaterally  CV regular rate rhythm no murmur  HEENT external canals are devoid of wax and look a little irritated but are not bothering him. Tympanic membrane's are little foggy bilaterally with no obvious effusion    Diabetic foot exam.  Good pulse, normal nails, intact skin. Tips of the big toe and second toe have reduced/no sensation to microfiber but the rest of the toe appears to be intact and other toes intact       Assessment and Plan     Diabetes,   diagnosed 10 years ago  Has had poor control for the last 3 years  Now appears to be under better control  I would estimate his A1c to be in the mid 6.   Fasting sugar 90-1 10     Metformin 500 mg XR twice daily  Glipizide 10 mg XR daily  Jardiance 10 mg daily     Continue current medication  If you develop low blood sugar in the next few months cut glipizide in half    Fast food every day for lunch. If his A1c is higher than his fasting sugars would suggest then he would like to work on diet and recheck in 3 months rather than medication change. Consider Jardiance dose increase     Diabetic neuropathy  Tingling in his toes. Just the tips. Does not feel he needs medication  Check B12 just in case    No microalbuminuria when last checked. Check today  Already taking lisinopril     Cholesterol at goal based former PCP blood work, LDL was 25     Kidney function is fine, GFR 82     Hypertension  Lisinopril-HCTZ 10 mg-12.5 daily  Has a home cuff. Well controlled with home readings  Continue to spot check     Health maintenance  Report last colonoscopy 1/1/2010 in records. Refer  He gets annual eye exam    Gets [facility] labs  3-6-month follow-up pending labs      ICD-10-CM ICD-9-CM    1. Routine general medical examination at a health care facility  Z00.00 V70.0 VITAMIN D, 25 HYDROXY      LIPID PANEL      CBC WITH AUTOMATED DIFF      METABOLIC PANEL, COMPREHENSIVE      HEMOGLOBIN A1C WITH EAG      VITAMIN B12 & FOLATE      VITAMIN D, 25 HYROXY PANEL      CANCELED: LIPID PANEL      CANCELED: CBC WITH AUTOMATED DIFF      CANCELED: METABOLIC PANEL, COMPREHENSIVE      CANCELED: HEMOGLOBIN A1C WITH EAG      CANCELED: VITAMIN B12 & FOLATE   2. Essential hypertension  I10 401.9 LIPID PANEL      CBC WITH AUTOMATED DIFF      METABOLIC PANEL, COMPREHENSIVE      CANCELED: LIPID PANEL      CANCELED: CBC WITH AUTOMATED DIFF      CANCELED: METABOLIC PANEL, COMPREHENSIVE   3. Diabetic mononeuropathy associated with type 2 diabetes mellitus (HCC)  E11.41 250.60 VITAMIN D, 25 HYDROXY     355.9 CANCELED: VITAMIN B12 & FOLATE   4.  Type 2 diabetes mellitus with diabetic mononeuropathy, without long-term current use of insulin ([facility])  E11.41 250.60  DIABETES FOOT EXAM     355.9 AMB POC URINE, MICROALBUMIN, SEMIQUANT (3 RESULTS)      HEMOGLOBIN A1C WITH EAG      metFORMIN ER (GLUCOPHAGE XR) 500 mg tablet      CANCELED: HEMOGLOBIN A1C WITH EAG   5. Mixed hyperlipidemia  E78.2 272.2    6. Colon cancer screening  Z12.11 V76.51 REFERRAL FOR COLONOSCOPY   7. B12 deficiency  E53.8 266.2 VITAMIN B12 & FOLATE      CANCELED: VITAMIN B12 & FOLATE   8. Vitamin D deficiency  E55.9 268.9 VITAMIN D, 25 HYDROXY      VITAMIN D, 25 HYROXY PANEL       AVS given.  Pt expressed understanding of instructions

## 2021-03-18 ENCOUNTER — IMMUNIZATION (OUTPATIENT)
Dept: INTERNAL MEDICINE CLINIC | Age: 66
End: 2021-03-18
Payer: COMMERCIAL

## 2021-03-18 DIAGNOSIS — Z23 ENCOUNTER FOR IMMUNIZATION: Primary | ICD-10-CM

## 2021-03-18 PROCEDURE — 0001A COVID-19, MRNA, LNP-S, PF, 30MCG/0.3ML DOSE(PFIZER): CPT | Performed by: FAMILY MEDICINE

## 2021-03-18 PROCEDURE — 91300 COVID-19, MRNA, LNP-S, PF, 30MCG/0.3ML DOSE(PFIZER): CPT | Performed by: FAMILY MEDICINE

## 2021-04-08 ENCOUNTER — IMMUNIZATION (OUTPATIENT)
Dept: INTERNAL MEDICINE CLINIC | Age: 66
End: 2021-04-08
Payer: COMMERCIAL

## 2021-04-08 DIAGNOSIS — Z23 ENCOUNTER FOR IMMUNIZATION: Primary | ICD-10-CM

## 2021-04-08 PROCEDURE — 0002A COVID-19, MRNA, LNP-S, PF, 30MCG/0.3ML DOSE(PFIZER): CPT | Performed by: FAMILY MEDICINE

## 2021-04-08 PROCEDURE — 91300 COVID-19, MRNA, LNP-S, PF, 30MCG/0.3ML DOSE(PFIZER): CPT | Performed by: FAMILY MEDICINE

## 2021-04-16 LAB
25(OH)D3+25(OH)D2 SERPL-MCNC: 29.2 NG/ML (ref 30–100)
ALBUMIN SERPL-MCNC: 4.4 G/DL (ref 3.8–4.8)
ALBUMIN/GLOB SERPL: 1.6 {RATIO} (ref 1.2–2.2)
ALP SERPL-CCNC: 91 IU/L (ref 39–117)
ALT SERPL-CCNC: 21 IU/L (ref 0–44)
AST SERPL-CCNC: 14 IU/L (ref 0–40)
BASOPHILS # BLD AUTO: 0.1 X10E3/UL (ref 0–0.2)
BASOPHILS NFR BLD AUTO: 1 %
BILIRUB SERPL-MCNC: 0.3 MG/DL (ref 0–1.2)
BUN SERPL-MCNC: 16 MG/DL (ref 8–27)
BUN/CREAT SERPL: 16 (ref 10–24)
CALCIUM SERPL-MCNC: 9.7 MG/DL (ref 8.6–10.2)
CHLORIDE SERPL-SCNC: 101 MMOL/L (ref 96–106)
CHOLEST SERPL-MCNC: 122 MG/DL (ref 100–199)
CO2 SERPL-SCNC: 24 MMOL/L (ref 20–29)
CREAT SERPL-MCNC: 1.02 MG/DL (ref 0.76–1.27)
EOSINOPHIL # BLD AUTO: 0.2 X10E3/UL (ref 0–0.4)
EOSINOPHIL NFR BLD AUTO: 3 %
ERYTHROCYTE [DISTWIDTH] IN BLOOD BY AUTOMATED COUNT: 13.3 % (ref 11.6–15.4)
EST. AVERAGE GLUCOSE BLD GHB EST-MCNC: 183 MG/DL
FOLATE SERPL-MCNC: 19.4 NG/ML
GLOBULIN SER CALC-MCNC: 2.8 G/DL (ref 1.5–4.5)
GLUCOSE SERPL-MCNC: 202 MG/DL (ref 65–99)
HBA1C MFR BLD: 8 % (ref 4.8–5.6)
HCT VFR BLD AUTO: 48.8 % (ref 37.5–51)
HDLC SERPL-MCNC: 41 MG/DL
HGB BLD-MCNC: 15.8 G/DL (ref 13–17.7)
IMM GRANULOCYTES # BLD AUTO: 0 X10E3/UL (ref 0–0.1)
IMM GRANULOCYTES NFR BLD AUTO: 0 %
IMP & REVIEW OF LAB RESULTS: NORMAL
LDLC SERPL CALC-MCNC: 41 MG/DL (ref 0–99)
LYMPHOCYTES # BLD AUTO: 1.9 X10E3/UL (ref 0.7–3.1)
LYMPHOCYTES NFR BLD AUTO: 35 %
MCH RBC QN AUTO: 27.8 PG (ref 26.6–33)
MCHC RBC AUTO-ENTMCNC: 32.4 G/DL (ref 31.5–35.7)
MCV RBC AUTO: 86 FL (ref 79–97)
MONOCYTES # BLD AUTO: 0.5 X10E3/UL (ref 0.1–0.9)
MONOCYTES NFR BLD AUTO: 8 %
NEUTROPHILS # BLD AUTO: 2.9 X10E3/UL (ref 1.4–7)
NEUTROPHILS NFR BLD AUTO: 53 %
PLATELET # BLD AUTO: 215 X10E3/UL (ref 150–450)
POTASSIUM SERPL-SCNC: 4.9 MMOL/L (ref 3.5–5.2)
PROT SERPL-MCNC: 7.2 G/DL (ref 6–8.5)
RBC # BLD AUTO: 5.68 X10E6/UL (ref 4.14–5.8)
SODIUM SERPL-SCNC: 137 MMOL/L (ref 134–144)
TRIGL SERPL-MCNC: 262 MG/DL (ref 0–149)
VIT B12 SERPL-MCNC: 557 PG/ML (ref 232–1245)
VLDLC SERPL CALC-MCNC: 40 MG/DL (ref 5–40)
WBC # BLD AUTO: 5.5 X10E3/UL (ref 3.4–10.8)

## 2021-04-20 DIAGNOSIS — E11.41 TYPE 2 DIABETES MELLITUS WITH DIABETIC MONONEUROPATHY, WITHOUT LONG-TERM CURRENT USE OF INSULIN (HCC): Primary | ICD-10-CM

## 2021-04-20 NOTE — TELEPHONE ENCOUNTER
Labs reviewed. They look good for the most part but A1c is 8.0 indicating an average blood sugar of 180. This is different than the numbers you have been getting on your home meter. If your meter is older than 1 year it might have become inaccurate. It is also possible that your diet during the day is driving your sugar up. You had wanted to try to work on your diet.   If you also want to try a medication change I suggest we increase Jardiance to its full dose 25 mg    Follow-up 3 months

## 2021-09-25 DIAGNOSIS — E11.41 TYPE 2 DIABETES MELLITUS WITH DIABETIC MONONEUROPATHY, WITHOUT LONG-TERM CURRENT USE OF INSULIN (HCC): ICD-10-CM

## 2021-09-28 RX ORDER — BLOOD SUGAR DIAGNOSTIC
STRIP MISCELLANEOUS
Qty: 100 STRIP | Refills: 3 | Status: SHIPPED | OUTPATIENT
Start: 2021-09-28

## 2021-12-27 RX ORDER — LISINOPRIL AND HYDROCHLOROTHIAZIDE 10; 12.5 MG/1; MG/1
TABLET ORAL
Qty: 90 TABLET | Refills: 3 | Status: SHIPPED | OUTPATIENT
Start: 2021-12-27 | End: 2021-12-28 | Stop reason: SDUPTHER

## 2021-12-28 DIAGNOSIS — E11.41 TYPE 2 DIABETES MELLITUS WITH DIABETIC MONONEUROPATHY, WITHOUT LONG-TERM CURRENT USE OF INSULIN (HCC): ICD-10-CM

## 2021-12-28 RX ORDER — LISINOPRIL AND HYDROCHLOROTHIAZIDE 10; 12.5 MG/1; MG/1
TABLET ORAL
Qty: 90 TABLET | Refills: 3 | Status: SHIPPED | OUTPATIENT
Start: 2021-12-28 | End: 2022-05-04 | Stop reason: SDUPTHER

## 2021-12-28 RX ORDER — ATORVASTATIN CALCIUM 20 MG/1
20 TABLET, FILM COATED ORAL DAILY
Qty: 90 TABLET | Refills: 3 | Status: SHIPPED | OUTPATIENT
Start: 2021-12-28 | End: 2022-05-04 | Stop reason: SDUPTHER

## 2022-02-15 ENCOUNTER — TELEPHONE (OUTPATIENT)
Dept: FAMILY MEDICINE CLINIC | Age: 67
End: 2022-02-15

## 2022-02-15 ENCOUNTER — NURSE TRIAGE (OUTPATIENT)
Dept: OTHER | Facility: CLINIC | Age: 67
End: 2022-02-15

## 2022-02-15 NOTE — TELEPHONE ENCOUNTER
Mrs Campbell Mulligan called on behalf of Mr. Campbell Mulligan who is having severe, left knee pain. This has been going on for the last 2 weeks, wants to be seen asap.

## 2022-02-15 NOTE — TELEPHONE ENCOUNTER
Pt scheduled for Monday.  Wife states he has been dealing with this pain for 2 weeks now he will be fine until Monday

## 2022-02-15 NOTE — TELEPHONE ENCOUNTER
LIMITED TRIAGE DUE TO PATIENT NOT PRESENT FOR THE CALL    Reason for Disposition   MODERATE pain (e.g., symptoms interfere with work or school, limping) and present > 3 days    Protocols used: KNEE PAIN-ADULT-[place]    Received call from [address] [place] [address] at [facility] with [employer]. Subjective: Caller states \"My  needs an appointment to have his knee looked at\"     Current Symptoms: Caller reports her  has experienced L knee pain for the past 2 weeks. Denies injury, swelling or bruising. Wife has noticed that her  has been limping    Onset: 2 weeks    Associated Symptoms: Difficulty walking    Pain Severity: Moderate pain    Temperature: Denies fever    What has been tried: Aspirin    LMP: NA Pregnant: NA    Recommended disposition: See in 3 days    Care advice provided, patient verbalizes understanding; denies any other questions or concerns; instructed to call back for any new or worsening symptoms. Attention Provider: Thank you for allowing me to participate in the care of your patient. The patient was connected to triage in response to information provided to the [facility]. Please do not respond through this encounter as the response is not directed to a shared pool.

## 2022-02-21 ENCOUNTER — OFFICE VISIT (OUTPATIENT)
Dept: FAMILY MEDICINE CLINIC | Age: 67
End: 2022-02-21
Payer: COMMERCIAL

## 2022-02-21 VITALS
OXYGEN SATURATION: 95 % | HEART RATE: 69 BPM | WEIGHT: 209.8 LBS | DIASTOLIC BLOOD PRESSURE: 83 MMHG | HEIGHT: 71 IN | RESPIRATION RATE: 16 BRPM | TEMPERATURE: 98.1 F | SYSTOLIC BLOOD PRESSURE: 126 MMHG | BODY MASS INDEX: 29.37 KG/M2

## 2022-02-21 DIAGNOSIS — G89.29 CHRONIC PAIN OF LEFT KNEE: Primary | ICD-10-CM

## 2022-02-21 DIAGNOSIS — E78.2 MIXED HYPERLIPIDEMIA: ICD-10-CM

## 2022-02-21 DIAGNOSIS — E11.41 DIABETIC MONONEUROPATHY ASSOCIATED WITH TYPE 2 DIABETES MELLITUS (HCC): ICD-10-CM

## 2022-02-21 DIAGNOSIS — I10 ESSENTIAL HYPERTENSION: ICD-10-CM

## 2022-02-21 DIAGNOSIS — M25.562 CHRONIC PAIN OF LEFT KNEE: Primary | ICD-10-CM

## 2022-02-21 PROCEDURE — 99214 OFFICE O/P EST MOD 30 MIN: CPT | Performed by: FAMILY MEDICINE

## 2022-02-21 RX ORDER — PREDNISONE 5 MG/1
TABLET ORAL
Qty: 21 TABLET | Refills: 0 | Status: SHIPPED | OUTPATIENT
Start: 2022-02-21 | End: 2022-06-28 | Stop reason: ALTCHOICE

## 2022-02-21 NOTE — PROGRESS NOTES
1. Have you been to the ER, urgent care clinic since your last visit? Hospitalized since your last visit? No    2. Have you seen or consulted any other health care providers outside of the [address] [place] [address] since your last visit? Include any pap smears or colon screening.  No    Health Maintenance Due   Topic Date Due    Hepatitis C Screening  Never done    Pneumococcal 0-64 years (1 of 2 - PPSV23) Never done    MICROALBUMIN Q1  Never done    Colorectal Cancer Screening Combo  Never done    Shingrix Vaccine Age 50> (1 of 2) Never done    Flu Vaccine (1) Never done    COVID-19 Vaccine (3 - Booster for Pfizer series) 09/08/2021    Foot Exam Q1  03/15/2022     Chief Complaint   Patient presents with    Knee Pain     Pt states about 2 weeks ago he pulled a muscle in (L) leg, now he has this shooting pain to his knee

## 2022-02-21 NOTE — PROGRESS NOTES
Chief Complaint   Patient presents with    Knee Pain     Pt states about 2 weeks ago he pulled a muscle in (L) leg, now he has this shooting pain to his knee     Pt reports that starting 2 weeks ago he developed tightness in the back of his left calf, shot up to the knee. Pt has pain with extension of the leg, fullness of the knee, does not feel right. Pt is not able to fully extend his lower leg. Pt has an active job, service job, does a lot of kneeling, active work. No known injury. No swelling noticed. Subjective: (As above and below)     Chief Complaint   Patient presents with    Knee Pain     Pt states about 2 weeks ago he pulled a muscle in (L) leg, now he has this shooting pain to his knee     he is a 61y.o. year old male who presents for evaluation. Reviewed PmHx, RxHx, FmHx, SocHx, AllgHx and updated in chart. Review of Systems - negative except as listed above    Objective:     Vitals:    02/21/22 1043   BP: 126/83   Pulse: 69   Resp: 16   Temp: 98.1 °F (36.7 °C)   TempSrc: Oral   SpO2: 95%   Weight: 209 lb 12.8 oz (95.2 kg)   Height: 5' 11\" (1.803 m)     Physical Examination: General appearance - alert, well appearing, and in no distress  Mental status - normal mood, behavior, speech, dress, motor activity, and thought processes  Ears - bilateral TM's and external ear canals normal  Chest - clear to auscultation, no wheezes, rales or rhonchi, symmetric air entry  Heart - normal rate, regular rhythm, normal S1, S2, no murmurs, rubs, clicks or gallops  Musculoskeletal - pain with full tension of left leg, unable to fully passively extend, no effusion of knee, no bakers cyst palpated  Extremities - peripheral pulses normal, no pedal edema, no clubbing or cyanosis    Assessment/ Plan:   1. Diabetic mononeuropathy associated with type 2 diabetes mellitus ([address]  -check labs  - HEMOGLOBIN A1C WITH EAG; Future    2.  Chronic pain of left knee  -trial of prednisone, will x-ray  If not improving   - URIC ACID; Future  - predniSONE (STERAPRED) 5 mg dose pack; See administration instruction per 5mg dose pack  Dispense: 21 Tablet; Refill: 0  - XR KNEE LT MAX 2 VWS; Future    3. Mixed hyperlipidemia  - METABOLIC PANEL, COMPREHENSIVE; Future  - LIPID PANEL; Future    4. Essential hypertension  -well controlled  - CBC W/O DIFF; Future  - TSH 3RD GENERATION; Future       I have discussed the diagnosis with the patient and the intended plan as seen in the above orders. The patient has received an after-visit summary and questions were answered concerning future plans.      Medication Side Effects and Warnings were discussed with patient: yes  Patient Labs were reviewed: yes  Patient Past Records were reviewed:  yes    Estuardo Hu M.D.

## 2022-02-22 LAB
ALBUMIN SERPL-MCNC: 4.1 G/DL (ref 3.5–5)
ALBUMIN/GLOB SERPL: 1.1 {RATIO} (ref 1.1–2.2)
ALP SERPL-CCNC: 108 U/L (ref 45–117)
ALT SERPL-CCNC: 35 U/L (ref 12–78)
ANION GAP SERPL CALC-SCNC: 6 MMOL/L (ref 5–15)
AST SERPL-CCNC: 14 U/L (ref 15–37)
BILIRUB SERPL-MCNC: 0.5 MG/DL (ref 0.2–1)
BUN SERPL-MCNC: 19 MG/DL (ref 6–20)
BUN/CREAT SERPL: 20 (ref 12–20)
CALCIUM SERPL-MCNC: 9.7 MG/DL (ref 8.5–10.1)
CHLORIDE SERPL-SCNC: 100 MMOL/L (ref 97–108)
CHOLEST SERPL-MCNC: 140 MG/DL
CO2 SERPL-SCNC: 28 MMOL/L (ref 21–32)
COMMENT, HOLDF: NORMAL
CREAT SERPL-MCNC: 0.97 MG/DL (ref 0.7–1.3)
ERYTHROCYTE [DISTWIDTH] IN BLOOD BY AUTOMATED COUNT: 13.2 % (ref 11.5–14.5)
EST. AVERAGE GLUCOSE BLD GHB EST-MCNC: 200 MG/DL
GLOBULIN SER CALC-MCNC: 3.6 G/DL (ref 2–4)
GLUCOSE SERPL-MCNC: 233 MG/DL (ref 65–100)
HBA1C MFR BLD: 8.6 % (ref 4–5.6)
HCT VFR BLD AUTO: 49.6 % (ref 36.6–50.3)
HCV AB SERPL QL IA: NONREACTIVE
HDLC SERPL-MCNC: 46 MG/DL
HDLC SERPL: 3 {RATIO} (ref 0–5)
HGB BLD-MCNC: 16 G/DL (ref 12.1–17)
LDLC SERPL CALC-MCNC: ABNORMAL MG/DL (ref 0–100)
LDLC SERPL DIRECT ASSAY-MCNC: 51 MG/DL (ref 0–100)
MCH RBC QN AUTO: 28.2 PG (ref 26–34)
MCHC RBC AUTO-ENTMCNC: 32.3 G/DL (ref 30–36.5)
MCV RBC AUTO: 87.5 FL (ref 80–99)
NRBC # BLD: 0 K/UL (ref 0–0.01)
NRBC BLD-RTO: 0 PER 100 WBC
PLATELET # BLD AUTO: 252 K/UL (ref 150–400)
PMV BLD AUTO: 9.6 FL (ref 8.9–12.9)
POTASSIUM SERPL-SCNC: 4.6 MMOL/L (ref 3.5–5.1)
PROT SERPL-MCNC: 7.7 G/DL (ref 6.4–8.2)
RBC # BLD AUTO: 5.67 M/UL (ref 4.1–5.7)
SAMPLES BEING HELD,HOLD: NORMAL
SODIUM SERPL-SCNC: 134 MMOL/L (ref 136–145)
TRIGL SERPL-MCNC: 423 MG/DL (ref ?–150)
TSH SERPL DL<=0.05 MIU/L-ACNC: 0.95 UIU/ML (ref 0.36–3.74)
URATE SERPL-MCNC: 4.2 MG/DL (ref 3.5–7.2)
VLDLC SERPL CALC-MCNC: ABNORMAL MG/DL
WBC # BLD AUTO: 6.5 K/UL (ref 4.1–11.1)

## 2022-03-18 PROBLEM — E78.2 MIXED HYPERLIPIDEMIA: Status: ACTIVE | Noted: 2020-01-07

## 2022-03-19 PROBLEM — E11.41 DIABETIC MONONEUROPATHY ASSOCIATED WITH TYPE 2 DIABETES MELLITUS (HCC): Status: ACTIVE | Noted: 2020-01-07

## 2022-03-19 PROBLEM — E11.41 TYPE 2 DIABETES MELLITUS WITH DIABETIC MONONEUROPATHY, WITHOUT LONG-TERM CURRENT USE OF INSULIN (HCC): Status: ACTIVE | Noted: 2020-01-07

## 2022-03-19 PROBLEM — I10 ESSENTIAL HYPERTENSION: Status: ACTIVE | Noted: 2020-01-07

## 2022-04-11 RX ORDER — GLIPIZIDE 10 MG/1
TABLET, FILM COATED, EXTENDED RELEASE ORAL
Qty: 90 TABLET | Refills: 1 | Status: SHIPPED | OUTPATIENT
Start: 2022-04-11 | End: 2022-05-04 | Stop reason: SDUPTHER

## 2022-05-04 DIAGNOSIS — E11.41 TYPE 2 DIABETES MELLITUS WITH DIABETIC MONONEUROPATHY, WITHOUT LONG-TERM CURRENT USE OF INSULIN (HCC): ICD-10-CM

## 2022-05-04 RX ORDER — LISINOPRIL AND HYDROCHLOROTHIAZIDE 10; 12.5 MG/1; MG/1
TABLET ORAL
Qty: 90 TABLET | Refills: 3 | Status: SHIPPED | OUTPATIENT
Start: 2022-05-04

## 2022-05-04 RX ORDER — GLIPIZIDE 10 MG/1
10 TABLET, FILM COATED, EXTENDED RELEASE ORAL DAILY
Qty: 90 TABLET | Refills: 1 | Status: SHIPPED | OUTPATIENT
Start: 2022-05-04 | End: 2022-10-14

## 2022-05-04 RX ORDER — METFORMIN HYDROCHLORIDE 500 MG/1
TABLET, EXTENDED RELEASE ORAL
Qty: 180 TABLET | Refills: 3 | Status: SHIPPED | OUTPATIENT
Start: 2022-05-04

## 2022-05-04 RX ORDER — ATORVASTATIN CALCIUM 20 MG/1
20 TABLET, FILM COATED ORAL DAILY
Qty: 90 TABLET | Refills: 3 | Status: SHIPPED | OUTPATIENT
Start: 2022-05-04

## 2022-05-04 NOTE — TELEPHONE ENCOUNTER
Pt is calling to get a 90 day supply of the following RX    Requested Prescriptions     Pending Prescriptions Disp Refills    empagliflozin (JARDIANCE) 25 mg tablet 90 Tablet 3     Sig: Take 1 Tablet by mouth daily.  atorvastatin (LIPITOR) 20 mg tablet 90 Tablet 3     Sig: Take 1 Tablet by mouth daily.  lisinopril-hydroCHLOROthiazide (PRINZIDE, ZESTORETIC) 10-12.5 mg per tablet 90 Tablet 3     Sig: TAKE 1 TABLET DAILY    glipiZIDE SR (GLUCOTROL XL) 10 mg CR tablet 90 Tablet 1     Si Tablet daily.     metFORMIN ER (GLUCOPHAGE XR) 500 mg tablet 180 Tablet 3     Sig: TAKE 1 TABLET BY MOUTH TWICE A DAY     Pt has confirmed the pharmacy as  [facility]

## 2022-06-28 ENCOUNTER — OFFICE VISIT (OUTPATIENT)
Dept: FAMILY MEDICINE CLINIC | Age: 67
End: 2022-06-28
Payer: COMMERCIAL

## 2022-06-28 VITALS
WEIGHT: 207.2 LBS | SYSTOLIC BLOOD PRESSURE: 128 MMHG | OXYGEN SATURATION: 95 % | HEIGHT: 71 IN | RESPIRATION RATE: 16 BRPM | DIASTOLIC BLOOD PRESSURE: 75 MMHG | HEART RATE: 69 BPM | BODY MASS INDEX: 29.01 KG/M2 | TEMPERATURE: 97.8 F

## 2022-06-28 DIAGNOSIS — N42.9 PROSTATE DISORDER: ICD-10-CM

## 2022-06-28 DIAGNOSIS — E11.41 TYPE 2 DIABETES MELLITUS WITH DIABETIC MONONEUROPATHY, WITHOUT LONG-TERM CURRENT USE OF INSULIN (HCC): ICD-10-CM

## 2022-06-28 DIAGNOSIS — R39.11 URINARY HESITANCY: Primary | ICD-10-CM

## 2022-06-28 LAB
ALBUMIN UR QL STRIP: 10 MG/L
BILIRUB UR QL STRIP: NEGATIVE
CREATININE, URINE POC: 50 MG/DL
GLUCOSE UR-MCNC: NORMAL MG/DL
HBA1C MFR BLD HPLC: 8.2 %
KETONES P FAST UR STRIP-MCNC: NEGATIVE MG/DL
MICROALBUMIN/CREAT RATIO POC: <30 MG/G
PH UR STRIP: 5.5 [PH] (ref 4.6–8)
PROT UR QL STRIP: NEGATIVE
SP GR UR STRIP: 1.01 (ref 1–1.03)
UA UROBILINOGEN AMB POC: NORMAL (ref 0.2–1)
URINALYSIS CLARITY POC: CLEAR
URINALYSIS COLOR POC: YELLOW
URINE BLOOD POC: NEGATIVE
URINE LEUKOCYTES POC: NEGATIVE
URINE NITRITES POC: NEGATIVE

## 2022-06-28 PROCEDURE — 82044 UR ALBUMIN SEMIQUANTITATIVE: CPT | Performed by: FAMILY MEDICINE

## 2022-06-28 PROCEDURE — 81003 URINALYSIS AUTO W/O SCOPE: CPT | Performed by: FAMILY MEDICINE

## 2022-06-28 PROCEDURE — 83036 HEMOGLOBIN GLYCOSYLATED A1C: CPT | Performed by: FAMILY MEDICINE

## 2022-06-28 PROCEDURE — 99214 OFFICE O/P EST MOD 30 MIN: CPT | Performed by: FAMILY MEDICINE

## 2022-06-28 PROCEDURE — 3052F HG A1C>EQUAL 8.0%<EQUAL 9.0%: CPT | Performed by: FAMILY MEDICINE

## 2022-06-28 NOTE — PROGRESS NOTES
Health Maintenance Due   Topic Date Due    Pneumococcal 0-64 years (1 - PCV) Never done    MICROALBUMIN Q1  Never done    Colorectal Cancer Screening Combo  Never done    Shingrix Vaccine Age 50> (1 of 2) Never done    COVID-19 Vaccine (3 - Booster for Pfizer series) 09/08/2021    Foot Exam Q1  03/15/2022     1. \"Have you been to the ER, urgent care clinic since your last visit? Hospitalized since your last visit? \" No    2. \"Have you seen or consulted any other health care providers outside of the [address] [place] [address] since your last visit? \" No     3. For patients aged 39-70: Has the patient had a colonoscopy / FIT/ Cologuard? No      If the patient is female:    4. For patients aged 41-77: Has the patient had a mammogram within the past 2 years? NA - based on age or sex      11. For patients aged 21-65: Has the patient had a pap smear? NA - based on age or sex    Do you have an [address] in place in the event that you have a healthcare crisis that could impact your decision making as it pertains to your health? NO    Would you like information about Advance Care Planning? NO    Information given.  NO

## 2022-06-28 NOTE — PROGRESS NOTES
NIKI Tomlinson is a 61 y.o. male who presents with a concern about his prostate. Reports that he has noticed that he has gotten older that his urine stream is slower but feels that in the last few weeks he has noticed a significant decrease in urine flow. Sometimes he stops midstream without feeling like he has fully voided and then finds that he is back in the bathroom 15 minutes later voiding some more. He has not seeing any difference in his nighttime trips to the bathroom. Only gets up once at night to urinate as he usually does. He reports no over-the-counter medicines or new prescription medicines. Reports larger and more satisfying bowel movements but does not see a correlation between bowel movements and increased urinary symptoms. He denies pain or burning with urination. Is not bothered enough by symptoms to request a medication for an enlarged prostate. He is primarily wanting to get a PSA check    PMHx:  Past Medical History:   Diagnosis Date    Diabetes ([facility] [address]     Hypertension        Meds:   Current Outpatient Medications   Medication Sig Dispense Refill    empagliflozin (JARDIANCE) 25 mg tablet Take 1 Tablet by mouth daily. 90 Tablet 3    atorvastatin (LIPITOR) 20 mg tablet Take 1 Tablet by mouth daily. 90 Tablet 3    lisinopril-hydroCHLOROthiazide (PRINZIDE, ZESTORETIC) 10-12.5 mg per tablet TAKE 1 TABLET DAILY 90 Tablet 3    glipiZIDE SR (GLUCOTROL XL) 10 mg CR tablet Take 1 Tablet by mouth daily. 90 Tablet 1    metFORMIN ER (GLUCOPHAGE XR) 500 mg tablet TAKE 1 TABLET BY MOUTH TWICE A  Tablet 3    Accu-Chek Guide test strips strip USE TO CHECK BLOOD SUGAR   ONCE DAILY 100 Strip 3    glucose blood VI test strips (Accu-Chek Guide test strips) strip Use to check blood sugar once daily 50 Strip 11    aspirin 81 mg chewable tablet Take 81 mg by mouth daily.  cinnamon bark 500 mg cap Take 500 mg by mouth daily.  ACCU-CHEK FASTCLIX LANCET DRUM misc as directed.  fish oil-dha-epa 1,200-144-216 mg cap Take 1,200 mg by mouth daily. Allergies: Allergies   Allergen Reactions    Nsaids (Non-Steroidal Anti-Inflammatory Drug) Other (comments)    Penicillins Other (comments)       Smoker:  Social History     Tobacco Use   Smoking Status Never Smoker   Smokeless Tobacco Never Used       ETOH:   Social History     Substance and Sexual Activity   Alcohol Use Yes       FH:   Family History   Problem Relation Age of Onset    Lupus Mother        ROS:   As listed in HPI. In addition:  Constitutional:   No headache, fever, fatigue, weight loss or weight gain      Cardiac:    No chest pain      Resp:   No cough or shortness of breath      Neuro   No loss of consciousness, dizziness, seizures      Physical Exam:  Blood pressure 128/75, pulse 69, temperature 97.8 °F (36.6 °C), temperature source Temporal, resp. rate 16, height 5' 11\" (1.803 m), weight 207 lb 3.2 oz (94 kg), SpO2 95 %. GEN: No apparent distress. Alert and oriented and responds to all questions appropriately. NEUROLOGIC:  No focal neurologic deficits. Strength and sensation grossly intact. Coordination and gait grossly intact. EXT: Well perfused. No edema. SKIN: No obvious rashes. Assessment and Plan     Urinalysis is remarkable only for glucose as would be expected taking Jardiance    He does not have symptoms of prostatitis although it could be an early case so I warned him to notify me if he develops pain    We will check a PSA and refer if elevated. He declines medication for an enlarged prostate. Is not bothered by his urinary symptoms. Only recent medication changes and increase in Jardiance from 10 mg to 25 mg    Diabetes  A1c 8.2 down from 8.6 up from 8.0  Diagnosed 10 years ago  Poor control for the last 3 years  Reporting fasting blood sugars 100-1 30. If accurate I suspect his A1c is higher than expected because of diet.   Previously reported he is eating fast food every day for lunch. ICD-10-CM ICD-9-CM    1. Urinary hesitancy  R39.11 788.64 AMB POC URINALYSIS DIP STICK AUTO W/O MICRO   2. Type 2 diabetes mellitus with diabetic mononeuropathy, without long-term current use of insulin (HCC)  E11.41 250.60 AMB POC HEMOGLOBIN A1C     355.9 AMB POC URINE, MICROALBUMIN, SEMIQUANT (3 RESULTS)   3. Prostate disorder  N42.9 602.9 PSA, DIAGNOSTIC (PROSTATE SPECIFIC AG)      PSA, DIAGNOSTIC (PROSTATE SPECIFIC AG)       AVS given.  Pt expressed understanding of instructions

## 2022-06-29 LAB
PSA SERPL-MCNC: 0.4 NG/ML (ref 0–4)
SPECIMEN STATUS REPORT, ROLRST: NORMAL

## 2022-06-30 ENCOUNTER — TELEPHONE (OUTPATIENT)
Dept: FAMILY MEDICINE CLINIC | Age: 67
End: 2022-06-30

## 2022-10-14 RX ORDER — GLIPIZIDE 10 MG/1
TABLET, FILM COATED, EXTENDED RELEASE ORAL
Qty: 90 TABLET | Refills: 3 | Status: SHIPPED | OUTPATIENT
Start: 2022-10-14

## 2022-10-21 ENCOUNTER — OFFICE VISIT (OUTPATIENT)
Dept: FAMILY MEDICINE CLINIC | Age: 67
End: 2022-10-21
Payer: COMMERCIAL

## 2022-10-21 VITALS
BODY MASS INDEX: 28.56 KG/M2 | HEART RATE: 78 BPM | HEIGHT: 71 IN | TEMPERATURE: 98.4 F | OXYGEN SATURATION: 94 % | WEIGHT: 204 LBS | RESPIRATION RATE: 18 BRPM | DIASTOLIC BLOOD PRESSURE: 78 MMHG | SYSTOLIC BLOOD PRESSURE: 123 MMHG

## 2022-10-21 DIAGNOSIS — E11.41 TYPE 2 DIABETES MELLITUS WITH DIABETIC MONONEUROPATHY, WITHOUT LONG-TERM CURRENT USE OF INSULIN (HCC): Primary | ICD-10-CM

## 2022-10-21 DIAGNOSIS — M25.562 ACUTE PAIN OF BOTH KNEES: ICD-10-CM

## 2022-10-21 DIAGNOSIS — M25.561 ACUTE PAIN OF BOTH KNEES: ICD-10-CM

## 2022-10-21 LAB — HBA1C MFR BLD HPLC: 8.6 %

## 2022-10-21 PROCEDURE — 3052F HG A1C>EQUAL 8.0%<EQUAL 9.0%: CPT | Performed by: FAMILY MEDICINE

## 2022-10-21 PROCEDURE — 83036 HEMOGLOBIN GLYCOSYLATED A1C: CPT | Performed by: FAMILY MEDICINE

## 2022-10-21 PROCEDURE — 99214 OFFICE O/P EST MOD 30 MIN: CPT | Performed by: FAMILY MEDICINE

## 2022-10-21 NOTE — PROGRESS NOTES
HPI  Elba Li is a 61 y.o. male who presents with bilateral knee pain. This started about 6 weeks ago with his left knee and then about 3 or 4 weeks ago feels like his right knee started bothering him as his left knee got better. He is not sure what injury he might have set this off. He has noticed the kneeling is quite painful and letting his knees touch at night is quite painful. Aches when he is driving. When it bothers him to get to sleep he takes 2 aspirin and then everything is fine    PMHx:  Past Medical History:   Diagnosis Date    Diabetes ([facility] [address]     Hypertension        Meds:   Current Outpatient Medications   Medication Sig Dispense Refill    glipiZIDE SR (GLUCOTROL XL) 10 mg CR tablet TAKE 1 TABLET DAILY 90 Tablet 3    empagliflozin (JARDIANCE) 25 mg tablet Take 1 Tablet by mouth daily. 90 Tablet 3    atorvastatin (LIPITOR) 20 mg tablet Take 1 Tablet by mouth daily. 90 Tablet 3    lisinopril-hydroCHLOROthiazide (PRINZIDE, ZESTORETIC) 10-12.5 mg per tablet TAKE 1 TABLET DAILY 90 Tablet 3    metFORMIN ER (GLUCOPHAGE XR) 500 mg tablet TAKE 1 TABLET BY MOUTH TWICE A  Tablet 3    Accu-Chek Guide test strips strip USE TO CHECK BLOOD SUGAR   ONCE DAILY 100 Strip 3    glucose blood VI test strips (Accu-Chek Guide test strips) strip Use to check blood sugar once daily 50 Strip 11    aspirin 81 mg chewable tablet Take 81 mg by mouth daily. cinnamon bark 500 mg cap Take 500 mg by mouth daily. ACCU-CHEK FASTCLIX LANCET DRUM misc as directed. fish oil-dha-epa 1,200-144-216 mg cap Take 1,200 mg by mouth daily. Allergies:    Allergies   Allergen Reactions    Nsaids (Non-Steroidal Anti-Inflammatory Drug) Other (comments)    Penicillins Other (comments)       Smoker:  Social History     Tobacco Use   Smoking Status Never   Smokeless Tobacco Never       ETOH:   Social History     Substance and Sexual Activity   Alcohol Use Yes       FH:   Family History   Problem Relation Age of Onset Lupus Mother        ROS:   As listed in HPI. In addition:  Constitutional:   No headache, fever, fatigue, weight loss or weight gain      Cardiac:    No chest pain      Resp:   No cough or shortness of breath      Neuro   No loss of consciousness, dizziness, seizures      Physical Exam:  Blood pressure 123/78, pulse 78, temperature 98.4 °F (36.9 °C), temperature source Temporal, resp. rate 18, height 5' 11\" (1.803 m), weight 204 lb (92.5 kg), SpO2 94 %. GEN: No apparent distress. Alert and oriented and responds to all questions appropriately. NEUROLOGIC:  No focal neurologic deficits. Strength and sensation grossly intact. Coordination and gait grossly intact. EXT: Well perfused. No edema. SKIN: No obvious rashes. Knees examined bilaterally. There is no apparent effusion or joint line tenderness. There is a prominent Baker's cyst bilaterally that is a little uncomfortable to palpation. He was not aware of this discomfort. There is no crepitus. Stable joint. Negative patellar grind       Assessment and Plan     Acute pain of both knees  There is some mild joint swelling and a Baker's cyst suggesting nonspecific inflammation. Exam is nonfocal and is actually getting better with exercise of quadriceps and hamstrings  I expect him to continue to improve with exercise. Provided him with basic knee exercises  Also provided with physical therapy referral that he should avail himself of if still having knee pain in about 2 weeks    Diabetes  A1c 8.6 up from 8.2 down from 8.6 up from 8.0  Diagnosed 10 years ago  Poor control for the last 3 years  Reports fasting sugar 100-1 30. I explained that this is due to his diet. He appears to be eating enough to drive his sugar into the mid 200s most of the day    Is eating a high carb and processed food diet.   He is under the impression that certain brands of pizza are okay whereas other brands of pizza or not based on his fasting blood sugars    He would like to work on diet if possible. I suggest he check his blood sugar little differently  Check your blood sugar before a meal and then 2 hours after a meal to get feedback on the effect that it has on your blood sugar      ICD-10-CM ICD-9-CM    1. Type 2 diabetes mellitus with diabetic mononeuropathy, without long-term current use of insulin (HCC)  E11.41 250.60 AMB POC HEMOGLOBIN A1C     355.9       2. Acute pain of both knees  M25.561 338.19 REFERRAL TO PHYSICAL THERAPY    M25.562 719.46           AVS given.  Pt expressed understanding of instructions

## 2022-10-21 NOTE — PROGRESS NOTES
Health Maintenance Due   Topic Date Due    Pneumococcal 0-64 years (1 - PCV) Never done    Colorectal Cancer Screening Combo  Never done    Shingrix Vaccine Age 50> (1 of 2) Never done    Foot Exam Q1  03/15/2022    Eye Exam Retinal or Dilated  07/21/2022    Flu Vaccine (1) Never done    COVID-19 Vaccine (4 - Booster for Eldridge Peter series) 08/01/2022     1. \"Have you been to the ER, urgent care clinic since your last visit? Hospitalized since your last visit? \" No    2. \"Have you seen or consulted any other health care providers outside of the [address] [place] [address] since your last visit? \" No     3. For patients aged 39-70: Has the patient had a colonoscopy / FIT/ Cologuard? No      If the patient is female:    4. For patients aged 41-77: Has the patient had a mammogram within the past 2 years? NA - based on age or sex      11. For patients aged 21-65: Has the patient had a pap smear?  NA - based on age or sex    [facility] [address]

## 2022-11-15 ENCOUNTER — OFFICE VISIT (OUTPATIENT)
Dept: FAMILY MEDICINE CLINIC | Age: 67
End: 2022-11-15
Payer: COMMERCIAL

## 2022-11-15 VITALS
BODY MASS INDEX: 29.2 KG/M2 | OXYGEN SATURATION: 97 % | HEIGHT: 71 IN | DIASTOLIC BLOOD PRESSURE: 73 MMHG | RESPIRATION RATE: 16 BRPM | WEIGHT: 208.6 LBS | HEART RATE: 68 BPM | SYSTOLIC BLOOD PRESSURE: 128 MMHG

## 2022-11-15 DIAGNOSIS — J06.9 UPPER RESPIRATORY TRACT INFECTION, UNSPECIFIED TYPE: Primary | ICD-10-CM

## 2022-11-15 PROCEDURE — 3074F SYST BP LT 130 MM HG: CPT | Performed by: FAMILY MEDICINE

## 2022-11-15 PROCEDURE — 1123F ACP DISCUSS/DSCN MKR DOCD: CPT | Performed by: FAMILY MEDICINE

## 2022-11-15 PROCEDURE — 99213 OFFICE O/P EST LOW 20 MIN: CPT | Performed by: FAMILY MEDICINE

## 2022-11-15 PROCEDURE — 3078F DIAST BP <80 MM HG: CPT | Performed by: FAMILY MEDICINE

## 2022-11-15 RX ORDER — AZITHROMYCIN 250 MG/1
TABLET, FILM COATED ORAL
Qty: 6 TABLET | Refills: 0 | Status: SHIPPED | OUTPATIENT
Start: 2022-11-15 | End: 2022-11-20

## 2022-11-15 NOTE — PROGRESS NOTES
Chief Complaint   Patient presents with    Cold Symptoms     Runny nose, cough, (no sore throat, no headache, no fever), loosing voice, pt states the sypomts started about 2 weeks ago but went away but came back last Wed. Patient presents today with complaint of runny nose, cough. Patient has had symptoms for about 2 weeks. He reports that at one point he was improving, however symptoms came back several days later. He denies a sore throat, headache or fever. No known sick contacts. Subjective: (As above and below)     Chief Complaint   Patient presents with    Cold Symptoms     Runny nose, cough, (no sore throat, no headache, no fever), loosing voice, pt states the sypomts started about 2 weeks ago but went away but came back last Wed. he is a 74y.o. year old male who presents for evaluation. Reviewed PmHx, RxHx, FmHx, SocHx, AllgHx and updated in chart. Review of Systems - negative except as listed above    Objective:     Vitals:    11/15/22 1348 11/15/22 1356   BP: (!) 143/79 128/73   Pulse: 68    Resp: 16    SpO2: 97%    Weight: 208 lb 9.6 oz (94.6 kg)    Height: 5' 11\" (1.803 m)      Physical Examination: General appearance - alert, well appearing, and in no distress  Mental status - normal mood, behavior, speech, dress, motor activity, and thought processes  Ears - bilateral TM's and external ear canals normal  Mouth - mucous membranes moist, pharynx normal without lesions  Chest - clear to auscultation, no wheezes, rales or rhonchi, symmetric air entry  Heart - normal rate, regular rhythm, normal S1, S2, no murmurs, rubs, clicks or gallops  Musculoskeletal - no joint tenderness, deformity or swelling  Extremities - peripheral pulses normal, no pedal edema, no clubbing or cyanosis    Assessment/ Plan:   1. Upper respiratory tract infection, unspecified type  -Advised patient on supportive measures, over-the-counter treatments. Advised to start antibiotic if symptoms or not improving. Patient agreed and pharmacy confirmed  - azithromycin (ZITHROMAX) 250 mg tablet; Take 2 tablets today, then take 1 tablet daily  Dispense: 6 Tablet; Refill: 0       I have discussed the diagnosis with the patient and the intended plan as seen in the above orders. The patient has received an after-visit summary and questions were answered concerning future plans.      Medication Side Effects and Warnings were discussed with patient: yes  Patient Labs were reviewed: yes  Patient Past Records were reviewed:  yes    Tonya Scott M.D.

## 2022-11-15 NOTE — PROGRESS NOTES
Chief Complaint   Patient presents with    Cold Symptoms     Runny nose, cough, (no sore throat, no headache, no fever), loosing voice, pt states the sypomts started about 2 weeks ago but went away but came back last Wed. Health Maintenance Due   Topic Date Due    Pneumococcal 65+ years (1 - PCV) Never done    Colorectal Cancer Screening Combo  Never done    Shingrix Vaccine Age 50> (1 of 2) Never done    Foot Exam Q1  03/15/2022    COVID-19 Vaccine (4 - Booster for Eldridge Peter series) 05/27/2022    Eye Exam Retinal or Dilated  07/21/2022     1. \"Have you been to the ER, urgent care clinic since your last visit? Hospitalized since your last visit? \" No    2. \"Have you seen or consulted any other health care providers outside of the [address] [place] [address] since your last visit? \" No     3. For patients aged 39-70: Has the patient had a colonoscopy / FIT/ Cologuard? No      If the patient is female:    4. For patients aged 41-77: Has the patient had a mammogram within the past 2 years? NA - based on age or sex      11. For patients aged 21-65: Has the patient had a pap smear?  NA - based on age or sex

## 2023-06-26 ENCOUNTER — TELEPHONE (OUTPATIENT)
Age: 68
End: 2023-06-26

## 2023-06-26 DIAGNOSIS — E11.41 TYPE 2 DIABETES MELLITUS WITH DIABETIC MONONEUROPATHY, WITHOUT LONG-TERM CURRENT USE OF INSULIN (HCC): Primary | ICD-10-CM

## 2023-06-26 RX ORDER — GLUCOSAMINE HCL/CHONDROITIN SU 500-400 MG
CAPSULE ORAL
Qty: 100 STRIP | Refills: 3 | Status: SHIPPED | OUTPATIENT
Start: 2023-06-26

## 2023-06-30 ENCOUNTER — TELEPHONE (OUTPATIENT)
Age: 68
End: 2023-06-30

## 2023-07-05 NOTE — TELEPHONE ENCOUNTER
Spoke with Theotis Ganser (rep)  She informed me that the Accu Chek test strips are not covered. The plan does cover One Touch verio and is processing.

## 2023-07-11 ENCOUNTER — TELEPHONE (OUTPATIENT)
Age: 68
End: 2023-07-11

## 2023-07-11 DIAGNOSIS — E11.41 TYPE 2 DIABETES MELLITUS WITH DIABETIC MONONEUROPATHY, WITHOUT LONG-TERM CURRENT USE OF INSULIN (HCC): Primary | ICD-10-CM

## 2023-07-11 RX ORDER — GLUCOSAMINE HCL/CHONDROITIN SU 500-400 MG
CAPSULE ORAL
Qty: 100 STRIP | Refills: 3 | Status: SHIPPED | OUTPATIENT
Start: 2023-07-11

## 2023-07-11 NOTE — TELEPHONE ENCOUNTER
Pt spouse is calling to get a One Touch Verio glucose meter sent to [facility] in [address]

## 2023-10-09 RX ORDER — ATORVASTATIN CALCIUM 20 MG/1
20 TABLET, FILM COATED ORAL DAILY
Qty: 90 TABLET | Refills: 0 | Status: SHIPPED | OUTPATIENT
Start: 2023-10-09

## 2023-10-09 RX ORDER — LISINOPRIL AND HYDROCHLOROTHIAZIDE 12.5; 1 MG/1; MG/1
1 TABLET ORAL DAILY
Qty: 90 TABLET | Refills: 0 | Status: SHIPPED | OUTPATIENT
Start: 2023-10-09

## 2023-10-09 RX ORDER — GLIPIZIDE 10 MG/1
10 TABLET, FILM COATED, EXTENDED RELEASE ORAL DAILY
Qty: 90 TABLET | Refills: 0 | Status: SHIPPED | OUTPATIENT
Start: 2023-10-09

## 2023-10-09 RX ORDER — EMPAGLIFLOZIN 25 MG/1
25 TABLET, FILM COATED ORAL DAILY
Qty: 90 TABLET | Refills: 0 | Status: SHIPPED | OUTPATIENT
Start: 2023-10-09

## 2023-10-09 RX ORDER — METFORMIN HYDROCHLORIDE 500 MG/1
500 TABLET, EXTENDED RELEASE ORAL 2 TIMES DAILY
Qty: 180 TABLET | Refills: 0 | Status: SHIPPED | OUTPATIENT
Start: 2023-10-09

## 2023-10-10 NOTE — TELEPHONE ENCOUNTER
Unable to reach pt to inform of medications refilled and due for an appt. Left voicemail to call back.

## 2023-11-07 ENCOUNTER — OFFICE VISIT (OUTPATIENT)
Age: 68
End: 2023-11-07
Payer: COMMERCIAL

## 2023-11-07 VITALS
HEART RATE: 63 BPM | DIASTOLIC BLOOD PRESSURE: 75 MMHG | HEIGHT: 71 IN | RESPIRATION RATE: 16 BRPM | WEIGHT: 203.4 LBS | TEMPERATURE: 98.1 F | BODY MASS INDEX: 28.48 KG/M2 | OXYGEN SATURATION: 96 % | SYSTOLIC BLOOD PRESSURE: 124 MMHG

## 2023-11-07 DIAGNOSIS — Z00.00 ROUTINE GENERAL MEDICAL EXAMINATION AT A HEALTH CARE FACILITY: Primary | ICD-10-CM

## 2023-11-07 DIAGNOSIS — Z12.11 COLON CANCER SCREENING: ICD-10-CM

## 2023-11-07 DIAGNOSIS — Z12.5 ENCOUNTER FOR SCREENING FOR MALIGNANT NEOPLASM OF PROSTATE: ICD-10-CM

## 2023-11-07 DIAGNOSIS — E11.41 TYPE 2 DIABETES MELLITUS WITH DIABETIC MONONEUROPATHY, WITHOUT LONG-TERM CURRENT USE OF INSULIN (HCC): ICD-10-CM

## 2023-11-07 LAB — HBA1C MFR BLD: 9.2 %

## 2023-11-07 PROCEDURE — 99397 PER PM REEVAL EST PAT 65+ YR: CPT | Performed by: FAMILY MEDICINE

## 2023-11-07 PROCEDURE — 3074F SYST BP LT 130 MM HG: CPT | Performed by: FAMILY MEDICINE

## 2023-11-07 PROCEDURE — 83036 HEMOGLOBIN GLYCOSYLATED A1C: CPT | Performed by: FAMILY MEDICINE

## 2023-11-07 PROCEDURE — 3078F DIAST BP <80 MM HG: CPT | Performed by: FAMILY MEDICINE

## 2023-11-07 RX ORDER — DULAGLUTIDE 0.75 MG/.5ML
0.75 INJECTION, SOLUTION SUBCUTANEOUS WEEKLY
Qty: 2 ML | Refills: 1 | Status: SHIPPED | OUTPATIENT
Start: 2023-11-07

## 2023-11-07 SDOH — ECONOMIC STABILITY: FOOD INSECURITY: WITHIN THE PAST 12 MONTHS, THE FOOD YOU BOUGHT JUST DIDN'T LAST AND YOU DIDN'T HAVE MONEY TO GET MORE.: NEVER TRUE

## 2023-11-07 SDOH — ECONOMIC STABILITY: HOUSING INSECURITY
IN THE LAST 12 MONTHS, WAS THERE A TIME WHEN YOU DID NOT HAVE A STEADY PLACE TO SLEEP OR SLEPT IN A SHELTER (INCLUDING NOW)?: NO

## 2023-11-07 SDOH — ECONOMIC STABILITY: INCOME INSECURITY: HOW HARD IS IT FOR YOU TO PAY FOR THE VERY BASICS LIKE FOOD, HOUSING, MEDICAL CARE, AND HEATING?: NOT HARD AT ALL

## 2023-11-07 SDOH — ECONOMIC STABILITY: FOOD INSECURITY: WITHIN THE PAST 12 MONTHS, YOU WORRIED THAT YOUR FOOD WOULD RUN OUT BEFORE YOU GOT MONEY TO BUY MORE.: NEVER TRUE

## 2023-11-07 ASSESSMENT — PATIENT HEALTH QUESTIONNAIRE - PHQ9
1. LITTLE INTEREST OR PLEASURE IN DOING THINGS: 0
SUM OF ALL RESPONSES TO PHQ QUESTIONS 1-9: 1
2. FEELING DOWN, DEPRESSED OR HOPELESS: 1
SUM OF ALL RESPONSES TO PHQ QUESTIONS 1-9: 1
SUM OF ALL RESPONSES TO PHQ9 QUESTIONS 1 & 2: 1

## 2023-11-07 NOTE — PROGRESS NOTES
HPI  Amarilys Barton is a 79 y.o. male who presents to follow-up on chronic medical issues. The last several years we have seen high A1c's despite excellent fasting blood sugars. He is eating healthy during the day but it sounds like his dinner is higher portion and less healthy than it could be. He feels like he does not have much control over this because his wife cooks    PMHx:  Past Medical History:   Diagnosis Date    Diabetes ([address])     Hypertension        Meds:   Current Outpatient Medications   Medication Sig Dispense Refill    Dulaglutide (TRULICITY) 0.75 EK/7.8KX SOPN Inject 0.75 mg into the skin once a week 2 mL 1    JARDIANCE 25 MG tablet TAKE 1 TABLET DAILY 90 tablet 0    atorvastatin (LIPITOR) 20 MG tablet TAKE 1 TABLET DAILY 90 tablet 0    lisinopril-hydroCHLOROthiazide (PRINZIDE;ZESTORETIC) 10-12.5 MG per tablet TAKE 1 TABLET DAILY 90 tablet 0    metFORMIN (GLUCOPHAGE-XR) 500 MG extended release tablet TAKE 1 TABLET TWICE A  tablet 0    glipiZIDE (GLUCOTROL XL) 10 MG extended release tablet TAKE 1 TABLET DAILY 90 tablet 0    blood glucose monitor strips Test 1 time a day. E11.9 100 strip 3    blood glucose monitor kit and supplies Test blood sugar once daily. Dispense all needed supplies to include: monitor, strips, lancing device, lancets, control solutions, alcohol swabs. E11.9 1 kit 0    aspirin 81 MG chewable tablet Take 1 tablet by mouth daily      Cinnamon 500 MG CAPS Take 1 capsule by mouth daily       No current facility-administered medications for this visit. Allergies: Allergies   Allergen Reactions    Nsaids Other (See Comments)    Penicillins Other (See Comments)       Smoker:  Social History     Tobacco Use   Smoking Status Never   Smokeless Tobacco Never       ETOH:   Social History     Substance and Sexual Activity   Alcohol Use Yes       FH:   Family History   Problem Relation Age of Onset    Lupus Mother        ROS:   As listed in HPI.  In addition:  Constitutional:

## 2023-11-08 ENCOUNTER — TELEPHONE (OUTPATIENT)
Age: 68
End: 2023-11-08

## 2023-11-08 LAB
ALBUMIN SERPL-MCNC: 4.7 G/DL (ref 3.9–4.9)
ALBUMIN/GLOB SERPL: 2 {RATIO} (ref 1.2–2.2)
ALP SERPL-CCNC: 84 IU/L (ref 44–121)
ALT SERPL-CCNC: 37 IU/L (ref 0–44)
AST SERPL-CCNC: 24 IU/L (ref 0–40)
BASOPHILS # BLD AUTO: 0 X10E3/UL (ref 0–0.2)
BASOPHILS NFR BLD AUTO: 1 %
BILIRUB SERPL-MCNC: 0.3 MG/DL (ref 0–1.2)
BUN SERPL-MCNC: 17 MG/DL (ref 8–27)
BUN/CREAT SERPL: 19 (ref 10–24)
CALCIUM SERPL-MCNC: 9.9 MG/DL (ref 8.6–10.2)
CHLORIDE SERPL-SCNC: 100 MMOL/L (ref 96–106)
CHOLEST SERPL-MCNC: 128 MG/DL (ref 100–199)
CO2 SERPL-SCNC: 20 MMOL/L (ref 20–29)
CREAT SERPL-MCNC: 0.91 MG/DL (ref 0.76–1.27)
EGFRCR SERPLBLD CKD-EPI 2021: 93 ML/MIN/1.73
EOSINOPHIL # BLD AUTO: 0.3 X10E3/UL (ref 0–0.4)
EOSINOPHIL NFR BLD AUTO: 5 %
ERYTHROCYTE [DISTWIDTH] IN BLOOD BY AUTOMATED COUNT: 13.9 % (ref 11.6–15.4)
GLOBULIN SER CALC-MCNC: 2.4 G/DL (ref 1.5–4.5)
GLUCOSE SERPL-MCNC: 142 MG/DL (ref 70–99)
HCT VFR BLD AUTO: 48.9 % (ref 37.5–51)
HDLC SERPL-MCNC: 47 MG/DL
HGB BLD-MCNC: 16 G/DL (ref 13–17.7)
IMM GRANULOCYTES # BLD AUTO: 0 X10E3/UL (ref 0–0.1)
IMM GRANULOCYTES NFR BLD AUTO: 0 %
IMP & REVIEW OF LAB RESULTS: NORMAL
LDLC SERPL CALC-MCNC: 45 MG/DL (ref 0–99)
LYMPHOCYTES # BLD AUTO: 2.2 X10E3/UL (ref 0.7–3.1)
LYMPHOCYTES NFR BLD AUTO: 38 %
MCH RBC QN AUTO: 28.1 PG (ref 26.6–33)
MCHC RBC AUTO-ENTMCNC: 32.7 G/DL (ref 31.5–35.7)
MCV RBC AUTO: 86 FL (ref 79–97)
MONOCYTES # BLD AUTO: 0.5 X10E3/UL (ref 0.1–0.9)
MONOCYTES NFR BLD AUTO: 9 %
NEUTROPHILS # BLD AUTO: 2.7 X10E3/UL (ref 1.4–7)
NEUTROPHILS NFR BLD AUTO: 47 %
PLATELET # BLD AUTO: 216 X10E3/UL (ref 150–450)
POTASSIUM SERPL-SCNC: 4.6 MMOL/L (ref 3.5–5.2)
PROT SERPL-MCNC: 7.1 G/DL (ref 6–8.5)
PSA SERPL-MCNC: 0.4 NG/ML (ref 0–4)
RBC # BLD AUTO: 5.7 X10E6/UL (ref 4.14–5.8)
SODIUM SERPL-SCNC: 135 MMOL/L (ref 134–144)
TRIGL SERPL-MCNC: 226 MG/DL (ref 0–149)
VLDLC SERPL CALC-MCNC: 36 MG/DL (ref 5–40)
WBC # BLD AUTO: 5.7 X10E3/UL (ref 3.4–10.8)

## 2023-11-08 NOTE — TELEPHONE ENCOUNTER
Faxed last office notes and labs to [facility] and received confirmation 11/8/23.

## 2023-11-08 NOTE — TELEPHONE ENCOUNTER
Sanket Sauer with [facility] is calling to get the last office note and labs    [phone] (fax)

## 2024-01-08 DIAGNOSIS — E11.41 TYPE 2 DIABETES MELLITUS WITH DIABETIC MONONEUROPATHY, WITHOUT LONG-TERM CURRENT USE OF INSULIN (HCC): ICD-10-CM

## 2024-01-08 RX ORDER — GLIPIZIDE 10 MG/1
10 TABLET, FILM COATED, EXTENDED RELEASE ORAL DAILY
Qty: 90 TABLET | Refills: 3 | Status: SHIPPED | OUTPATIENT
Start: 2024-01-08 | End: 2024-01-08 | Stop reason: SDUPTHER

## 2024-01-08 RX ORDER — DULAGLUTIDE 0.75 MG/.5ML
0.75 INJECTION, SOLUTION SUBCUTANEOUS WEEKLY
Qty: 2 ML | Refills: 1 | Status: SHIPPED | OUTPATIENT
Start: 2024-01-08

## 2024-01-08 RX ORDER — GLIPIZIDE 10 MG/1
10 TABLET, FILM COATED, EXTENDED RELEASE ORAL DAILY
Qty: 90 TABLET | Refills: 3 | Status: SHIPPED | OUTPATIENT
Start: 2024-01-08

## 2024-01-08 RX ORDER — METFORMIN HYDROCHLORIDE 500 MG/1
500 TABLET, EXTENDED RELEASE ORAL 2 TIMES DAILY
Qty: 180 TABLET | Refills: 3 | Status: SHIPPED | OUTPATIENT
Start: 2024-01-08

## 2024-01-08 RX ORDER — LISINOPRIL AND HYDROCHLOROTHIAZIDE 12.5; 1 MG/1; MG/1
1 TABLET ORAL DAILY
Qty: 90 TABLET | Refills: 3 | Status: SHIPPED | OUTPATIENT
Start: 2024-01-08

## 2024-01-08 RX ORDER — LISINOPRIL AND HYDROCHLOROTHIAZIDE 12.5; 1 MG/1; MG/1
1 TABLET ORAL DAILY
Qty: 90 TABLET | Refills: 3 | Status: SHIPPED | OUTPATIENT
Start: 2024-01-08 | End: 2024-01-08 | Stop reason: SDUPTHER

## 2024-01-08 RX ORDER — METFORMIN HYDROCHLORIDE 500 MG/1
500 TABLET, EXTENDED RELEASE ORAL 2 TIMES DAILY
Qty: 180 TABLET | Refills: 3 | Status: SHIPPED | OUTPATIENT
Start: 2024-01-08 | End: 2024-01-08 | Stop reason: SDUPTHER

## 2024-01-08 RX ORDER — ATORVASTATIN CALCIUM 20 MG/1
20 TABLET, FILM COATED ORAL DAILY
Qty: 90 TABLET | Refills: 3 | Status: SHIPPED | OUTPATIENT
Start: 2024-01-08

## 2024-01-08 RX ORDER — ATORVASTATIN CALCIUM 20 MG/1
20 TABLET, FILM COATED ORAL DAILY
Qty: 90 TABLET | Refills: 3 | Status: SHIPPED | OUTPATIENT
Start: 2024-01-08 | End: 2024-01-08 | Stop reason: SDUPTHER

## 2024-01-18 DIAGNOSIS — E11.41 TYPE 2 DIABETES MELLITUS WITH DIABETIC MONONEUROPATHY, WITHOUT LONG-TERM CURRENT USE OF INSULIN (HCC): ICD-10-CM

## 2024-01-18 RX ORDER — DULAGLUTIDE 0.75 MG/.5ML
0.75 INJECTION, SOLUTION SUBCUTANEOUS WEEKLY
Qty: 6 ML | Refills: 1 | Status: SHIPPED | OUTPATIENT
Start: 2024-01-18 | End: 2024-01-19 | Stop reason: SDUPTHER

## 2024-01-19 DIAGNOSIS — E11.41 TYPE 2 DIABETES MELLITUS WITH DIABETIC MONONEUROPATHY, WITHOUT LONG-TERM CURRENT USE OF INSULIN (HCC): ICD-10-CM

## 2024-01-19 RX ORDER — DULAGLUTIDE 0.75 MG/.5ML
0.75 INJECTION, SOLUTION SUBCUTANEOUS WEEKLY
Qty: 6 ML | Refills: 1 | Status: SHIPPED | OUTPATIENT
Start: 2024-01-19

## 2024-01-24 ENCOUNTER — OFFICE VISIT (OUTPATIENT)
Age: 69
End: 2024-01-24
Payer: COMMERCIAL

## 2024-01-24 VITALS
DIASTOLIC BLOOD PRESSURE: 73 MMHG | OXYGEN SATURATION: 96 % | BODY MASS INDEX: 28.98 KG/M2 | SYSTOLIC BLOOD PRESSURE: 111 MMHG | TEMPERATURE: 98 F | HEART RATE: 71 BPM | RESPIRATION RATE: 17 BRPM | WEIGHT: 207 LBS | HEIGHT: 71 IN

## 2024-01-24 DIAGNOSIS — E78.2 MIXED HYPERLIPIDEMIA: ICD-10-CM

## 2024-01-24 DIAGNOSIS — I10 ESSENTIAL HYPERTENSION: ICD-10-CM

## 2024-01-24 DIAGNOSIS — E11.41 TYPE 2 DIABETES MELLITUS WITH DIABETIC MONONEUROPATHY, WITHOUT LONG-TERM CURRENT USE OF INSULIN (HCC): Primary | ICD-10-CM

## 2024-01-24 LAB — HBA1C MFR BLD: 9.4 %

## 2024-01-24 PROCEDURE — 83036 HEMOGLOBIN GLYCOSYLATED A1C: CPT | Performed by: FAMILY MEDICINE

## 2024-01-24 PROCEDURE — G8427 DOCREV CUR MEDS BY ELIG CLIN: HCPCS | Performed by: FAMILY MEDICINE

## 2024-01-24 PROCEDURE — 3074F SYST BP LT 130 MM HG: CPT | Performed by: FAMILY MEDICINE

## 2024-01-24 PROCEDURE — 99214 OFFICE O/P EST MOD 30 MIN: CPT | Performed by: FAMILY MEDICINE

## 2024-01-24 PROCEDURE — G8484 FLU IMMUNIZE NO ADMIN: HCPCS | Performed by: FAMILY MEDICINE

## 2024-01-24 PROCEDURE — 2022F DILAT RTA XM EVC RTNOPTHY: CPT | Performed by: FAMILY MEDICINE

## 2024-01-24 PROCEDURE — 3078F DIAST BP <80 MM HG: CPT | Performed by: FAMILY MEDICINE

## 2024-01-24 PROCEDURE — G8419 CALC BMI OUT NRM PARAM NOF/U: HCPCS | Performed by: FAMILY MEDICINE

## 2024-01-24 PROCEDURE — 3046F HEMOGLOBIN A1C LEVEL >9.0%: CPT | Performed by: FAMILY MEDICINE

## 2024-01-24 PROCEDURE — 1123F ACP DISCUSS/DSCN MKR DOCD: CPT | Performed by: FAMILY MEDICINE

## 2024-01-24 PROCEDURE — 3017F COLORECTAL CA SCREEN DOC REV: CPT | Performed by: FAMILY MEDICINE

## 2024-01-24 PROCEDURE — 1036F TOBACCO NON-USER: CPT | Performed by: FAMILY MEDICINE

## 2024-01-24 RX ORDER — BLOOD SUGAR DIAGNOSTIC
1 STRIP MISCELLANEOUS DAILY
Qty: 100 EACH | Refills: 3 | Status: SHIPPED | OUTPATIENT
Start: 2024-01-24

## 2024-01-24 RX ORDER — DULAGLUTIDE 1.5 MG/.5ML
1.5 INJECTION, SOLUTION SUBCUTANEOUS WEEKLY
Qty: 6 ML | Refills: 3 | Status: SHIPPED | OUTPATIENT
Start: 2024-01-24

## 2024-01-24 ASSESSMENT — PATIENT HEALTH QUESTIONNAIRE - PHQ9
1. LITTLE INTEREST OR PLEASURE IN DOING THINGS: 0
2. FEELING DOWN, DEPRESSED OR HOPELESS: 0
SUM OF ALL RESPONSES TO PHQ QUESTIONS 1-9: 0
SUM OF ALL RESPONSES TO PHQ QUESTIONS 1-9: 0
SUM OF ALL RESPONSES TO PHQ9 QUESTIONS 1 & 2: 0
SUM OF ALL RESPONSES TO PHQ QUESTIONS 1-9: 0
SUM OF ALL RESPONSES TO PHQ QUESTIONS 1-9: 0

## 2024-01-24 NOTE — PROGRESS NOTES
Chief Complaint   Patient presents with    3 Month Follow-Up         Health Maintenance Due   Topic Date Due    Pneumococcal 65+ years Vaccine (1 - PCV) Never done    Colorectal Cancer Screen  Never done    Shingles vaccine (1 of 2) Never done    Respiratory Syncytial Virus (RSV) Pregnant or age 60 yrs+ (1 - 1-dose 60+ series) Never done    Diabetic retinal exam  07/21/2021    Diabetic foot exam  03/15/2022    Diabetic Alb to Cr ratio (uACR) test  06/28/2023    COVID-19 Vaccine (4 - 2023-24 season) 09/01/2023    A1C test (Diabetic or Prediabetic)  02/07/2024         \"Have you been to the ER, urgent care clinic since your last visit?  Hospitalized since your last visit?\"    NO    “Have you seen or consulted any other health care providers outside of [facility] since your last visit?”    NO    “Have you had a colorectal cancer screening such as a colonoscopy/FIT/Cologuard?    NO         
SC injection            AVS given. Pt expressed understanding of instructions      Jimenez MATHIS Jose was evaluated today and a DME order was entered for a glucometer and diabetic testing supplies because he requires this for treatment of diabetes mellitus.  The patient has been sufficiently trained to use the device and he has skills to be able to use this device as intended.  This patient is not requiring the use of insulin for diabetic control.  The patient will require testing 1 times per day to achieve optimal diabetic control.  Patient has Not applicable.  The need for this equipment was discussed with the patient and he understands and is in agreement.

## 2024-05-14 ENCOUNTER — TELEPHONE (OUTPATIENT)
Age: 69
End: 2024-05-14

## 2024-05-14 NOTE — TELEPHONE ENCOUNTER
Patients wife is calling to see if scripts can be sent over to patients new insurance. Wife is going to update insurance information through [employer].

## 2024-05-15 RX ORDER — LISINOPRIL AND HYDROCHLOROTHIAZIDE 12.5; 1 MG/1; MG/1
1 TABLET ORAL DAILY
Qty: 90 TABLET | Refills: 3 | Status: SHIPPED | OUTPATIENT
Start: 2024-05-15

## 2024-05-15 RX ORDER — METFORMIN HYDROCHLORIDE 500 MG/1
500 TABLET, EXTENDED RELEASE ORAL 2 TIMES DAILY
Qty: 180 TABLET | Refills: 3 | Status: SHIPPED | OUTPATIENT
Start: 2024-05-15

## 2024-05-15 RX ORDER — GLIPIZIDE 10 MG/1
10 TABLET, FILM COATED, EXTENDED RELEASE ORAL DAILY
Qty: 90 TABLET | Refills: 3 | Status: SHIPPED | OUTPATIENT
Start: 2024-05-15

## 2024-05-15 RX ORDER — ATORVASTATIN CALCIUM 20 MG/1
20 TABLET, FILM COATED ORAL DAILY
Qty: 90 TABLET | Refills: 3 | Status: SHIPPED | OUTPATIENT
Start: 2024-05-15

## 2024-05-15 NOTE — TELEPHONE ENCOUNTER
----- Message from Jimenez Garcia sent at 5/15/2024  2:01 PM EDT -----  Regarding: pharmacy  Contact: [phone]  Luis Reeves,  For now I need 90 day scripts sent to [facility] in [place].   Metformin   Atorvastatin   Glipizide  Lisinopril  Jardiance tabs ( these seem to be working well, shots didn't do well)  Thank you,   Erwin Garcia

## 2024-06-11 ENCOUNTER — OFFICE VISIT (OUTPATIENT)
Age: 69
End: 2024-06-11
Payer: MEDICARE

## 2024-06-11 VITALS
OXYGEN SATURATION: 98 % | SYSTOLIC BLOOD PRESSURE: 117 MMHG | RESPIRATION RATE: 16 BRPM | HEART RATE: 55 BPM | HEIGHT: 71 IN | WEIGHT: 192.4 LBS | BODY MASS INDEX: 26.94 KG/M2 | DIASTOLIC BLOOD PRESSURE: 74 MMHG

## 2024-06-11 DIAGNOSIS — I10 ESSENTIAL HYPERTENSION: ICD-10-CM

## 2024-06-11 DIAGNOSIS — E78.2 MIXED HYPERLIPIDEMIA: ICD-10-CM

## 2024-06-11 DIAGNOSIS — E11.41 TYPE 2 DIABETES MELLITUS WITH DIABETIC MONONEUROPATHY, WITHOUT LONG-TERM CURRENT USE OF INSULIN (HCC): Primary | ICD-10-CM

## 2024-06-11 LAB — HBA1C MFR BLD: 8.6 %

## 2024-06-11 PROCEDURE — 3078F DIAST BP <80 MM HG: CPT | Performed by: FAMILY MEDICINE

## 2024-06-11 PROCEDURE — PBSHW AMB POC HEMOGLOBIN A1C: Performed by: FAMILY MEDICINE

## 2024-06-11 PROCEDURE — 99214 OFFICE O/P EST MOD 30 MIN: CPT | Performed by: FAMILY MEDICINE

## 2024-06-11 PROCEDURE — 1123F ACP DISCUSS/DSCN MKR DOCD: CPT | Performed by: FAMILY MEDICINE

## 2024-06-11 PROCEDURE — 3017F COLORECTAL CA SCREEN DOC REV: CPT | Performed by: FAMILY MEDICINE

## 2024-06-11 PROCEDURE — 1036F TOBACCO NON-USER: CPT | Performed by: FAMILY MEDICINE

## 2024-06-11 PROCEDURE — 83036 HEMOGLOBIN GLYCOSYLATED A1C: CPT | Performed by: FAMILY MEDICINE

## 2024-06-11 PROCEDURE — 3074F SYST BP LT 130 MM HG: CPT | Performed by: FAMILY MEDICINE

## 2024-06-11 PROCEDURE — G8419 CALC BMI OUT NRM PARAM NOF/U: HCPCS | Performed by: FAMILY MEDICINE

## 2024-06-11 PROCEDURE — G8427 DOCREV CUR MEDS BY ELIG CLIN: HCPCS | Performed by: FAMILY MEDICINE

## 2024-06-11 PROCEDURE — 2022F DILAT RTA XM EVC RTNOPTHY: CPT | Performed by: FAMILY MEDICINE

## 2024-06-11 PROCEDURE — 3046F HEMOGLOBIN A1C LEVEL >9.0%: CPT | Performed by: FAMILY MEDICINE

## 2024-06-11 ASSESSMENT — PATIENT HEALTH QUESTIONNAIRE - PHQ9
2. FEELING DOWN, DEPRESSED OR HOPELESS: NOT AT ALL
SUM OF ALL RESPONSES TO PHQ QUESTIONS 1-9: 0
SUM OF ALL RESPONSES TO PHQ QUESTIONS 1-9: 0
1. LITTLE INTEREST OR PLEASURE IN DOING THINGS: NOT AT ALL
SUM OF ALL RESPONSES TO PHQ QUESTIONS 1-9: 0
SUM OF ALL RESPONSES TO PHQ9 QUESTIONS 1 & 2: 0
SUM OF ALL RESPONSES TO PHQ QUESTIONS 1-9: 0

## 2024-06-11 NOTE — PROGRESS NOTES
Chief Complaint   Patient presents with    Diabetes     Follow up          Health Maintenance Due   Topic Date Due    Pneumococcal 65+ years Vaccine (1 of 2 - PCV) Never done    Shingles vaccine (1 of 2) Never done    Respiratory Syncytial Virus (RSV) Pregnant or age 60 yrs+ (1 - 1-dose 60+ series) Never done    Diabetic retinal exam  07/21/2021    Diabetic foot exam  03/15/2022    Diabetic Alb to Cr ratio (uACR) test  06/28/2023    COVID-19 Vaccine (4 - 2023-24 season) 09/01/2023    A1C test (Diabetic or Prediabetic)  04/24/2024         \"Have you been to the ER, urgent care clinic since your last visit?  Hospitalized since your last visit?\"    NO    “Have you seen or consulted any other health care providers outside of [facility] since your last visit?”    NO           
hypertension  I10       3. Mixed hyperlipidemia  E78.2             AVS given. Pt expressed understanding of instructions      Jimenez Garcia was evaluated today and a DME order was entered for a glucometer and diabetic testing supplies because he requires this for treatment of diabetes mellitus.  The patient has been sufficiently trained to use the device and he has skills to be able to use this device as intended.  This patient is not requiring the use of insulin for diabetic control.  The patient will require testing 1 times per day to achieve optimal diabetic control.  Patient has Not applicable.  The need for this equipment was discussed with the patient and he understands and is in agreement.

## 2024-08-19 RX ORDER — EMPAGLIFLOZIN 25 MG/1
25 TABLET, FILM COATED ORAL DAILY
Qty: 90 TABLET | Refills: 3 | Status: SHIPPED | OUTPATIENT
Start: 2024-08-19

## 2024-09-10 ENCOUNTER — OFFICE VISIT (OUTPATIENT)
Age: 69
End: 2024-09-10
Payer: MEDICARE

## 2024-09-10 VITALS
RESPIRATION RATE: 17 BRPM | WEIGHT: 195.4 LBS | OXYGEN SATURATION: 93 % | SYSTOLIC BLOOD PRESSURE: 111 MMHG | BODY MASS INDEX: 27.35 KG/M2 | DIASTOLIC BLOOD PRESSURE: 72 MMHG | TEMPERATURE: 98 F | HEART RATE: 59 BPM | HEIGHT: 71 IN

## 2024-09-10 DIAGNOSIS — E78.2 MIXED HYPERLIPIDEMIA: ICD-10-CM

## 2024-09-10 DIAGNOSIS — Z00.00 ROUTINE GENERAL MEDICAL EXAMINATION AT A HEALTH CARE FACILITY: Primary | ICD-10-CM

## 2024-09-10 DIAGNOSIS — I10 ESSENTIAL HYPERTENSION: ICD-10-CM

## 2024-09-10 DIAGNOSIS — E55.9 VITAMIN D DEFICIENCY: ICD-10-CM

## 2024-09-10 DIAGNOSIS — N40.1 BENIGN PROSTATIC HYPERPLASIA WITH NOCTURIA: ICD-10-CM

## 2024-09-10 DIAGNOSIS — E53.8 B12 DEFICIENCY: ICD-10-CM

## 2024-09-10 DIAGNOSIS — E11.41 TYPE 2 DIABETES MELLITUS WITH DIABETIC MONONEUROPATHY, WITHOUT LONG-TERM CURRENT USE OF INSULIN (HCC): ICD-10-CM

## 2024-09-10 DIAGNOSIS — Z12.5 ENCOUNTER FOR SCREENING FOR MALIGNANT NEOPLASM OF PROSTATE: ICD-10-CM

## 2024-09-10 DIAGNOSIS — R35.1 BENIGN PROSTATIC HYPERPLASIA WITH NOCTURIA: ICD-10-CM

## 2024-09-10 LAB — HBA1C MFR BLD: 9 %

## 2024-09-10 PROCEDURE — 99214 OFFICE O/P EST MOD 30 MIN: CPT | Performed by: FAMILY MEDICINE

## 2024-09-10 PROCEDURE — 83036 HEMOGLOBIN GLYCOSYLATED A1C: CPT | Performed by: FAMILY MEDICINE

## 2024-09-10 RX ORDER — TAMSULOSIN HYDROCHLORIDE 0.4 MG/1
0.4 CAPSULE ORAL DAILY
Qty: 90 CAPSULE | Refills: 3 | Status: SHIPPED | OUTPATIENT
Start: 2024-09-10

## 2024-09-11 LAB
25(OH)D3 SERPL-MCNC: 38 NG/ML (ref 30–100)
ALBUMIN SERPL-MCNC: 4.2 G/DL (ref 3.5–5)
ALBUMIN/GLOB SERPL: 1.4 (ref 1.1–2.2)
ALP SERPL-CCNC: 88 U/L (ref 45–117)
ALT SERPL-CCNC: 31 U/L (ref 12–78)
ANION GAP SERPL CALC-SCNC: 5 MMOL/L (ref 2–12)
AST SERPL-CCNC: 10 U/L (ref 15–37)
BASOPHILS # BLD: 0.1 K/UL (ref 0–0.1)
BASOPHILS NFR BLD: 1 % (ref 0–1)
BILIRUB SERPL-MCNC: 0.6 MG/DL (ref 0.2–1)
BUN SERPL-MCNC: 17 MG/DL (ref 6–20)
BUN/CREAT SERPL: 17 (ref 12–20)
CALCIUM SERPL-MCNC: 9.6 MG/DL (ref 8.5–10.1)
CHLORIDE SERPL-SCNC: 103 MMOL/L (ref 97–108)
CHOLEST SERPL-MCNC: 138 MG/DL
CO2 SERPL-SCNC: 29 MMOL/L (ref 21–32)
COMMENT:: NORMAL
CREAT SERPL-MCNC: 1.01 MG/DL (ref 0.7–1.3)
DIFFERENTIAL METHOD BLD: ABNORMAL
EOSINOPHIL # BLD: 0.5 K/UL (ref 0–0.4)
EOSINOPHIL NFR BLD: 8 % (ref 0–7)
ERYTHROCYTE [DISTWIDTH] IN BLOOD BY AUTOMATED COUNT: 13.2 % (ref 11.5–14.5)
FOLATE SERPL-MCNC: 41.8 NG/ML (ref 5–21)
GLOBULIN SER CALC-MCNC: 3 G/DL (ref 2–4)
GLUCOSE SERPL-MCNC: 149 MG/DL (ref 65–100)
HCT VFR BLD AUTO: 46.6 % (ref 36.6–50.3)
HDLC SERPL-MCNC: 46 MG/DL
HDLC SERPL: 3 (ref 0–5)
HGB BLD-MCNC: 15.3 G/DL (ref 12.1–17)
IMM GRANULOCYTES # BLD AUTO: 0 K/UL (ref 0–0.04)
IMM GRANULOCYTES NFR BLD AUTO: 0 % (ref 0–0.5)
LDLC SERPL CALC-MCNC: 37.8 MG/DL (ref 0–100)
LYMPHOCYTES # BLD: 2.6 K/UL (ref 0.8–3.5)
LYMPHOCYTES NFR BLD: 36 % (ref 12–49)
MCH RBC QN AUTO: 28.6 PG (ref 26–34)
MCHC RBC AUTO-ENTMCNC: 32.8 G/DL (ref 30–36.5)
MCV RBC AUTO: 87.1 FL (ref 80–99)
MONOCYTES # BLD: 0.7 K/UL (ref 0–1)
MONOCYTES NFR BLD: 10 % (ref 5–13)
NEUTS SEG # BLD: 3.2 K/UL (ref 1.8–8)
NEUTS SEG NFR BLD: 45 % (ref 32–75)
NRBC # BLD: 0 K/UL (ref 0–0.01)
NRBC BLD-RTO: 0 PER 100 WBC
PLATELET # BLD AUTO: 246 K/UL (ref 150–400)
PMV BLD AUTO: 9.9 FL (ref 8.9–12.9)
POTASSIUM SERPL-SCNC: 4.4 MMOL/L (ref 3.5–5.1)
PROT SERPL-MCNC: 7.2 G/DL (ref 6.4–8.2)
PSA SERPL-MCNC: 0.7 NG/ML (ref 0.01–4)
RBC # BLD AUTO: 5.35 M/UL (ref 4.1–5.7)
SODIUM SERPL-SCNC: 137 MMOL/L (ref 136–145)
SPECIMEN HOLD: NORMAL
TRIGL SERPL-MCNC: 271 MG/DL
VIT B12 SERPL-MCNC: 453 PG/ML (ref 193–986)
VLDLC SERPL CALC-MCNC: 54.2 MG/DL
WBC # BLD AUTO: 7.2 K/UL (ref 4.1–11.1)

## 2024-09-12 LAB — FRUCTOSAMINE SERPL-SCNC: 351 UMOL/L (ref 0–285)

## 2024-12-10 ENCOUNTER — OFFICE VISIT (OUTPATIENT)
Age: 69
End: 2024-12-10
Payer: MEDICARE

## 2024-12-10 VITALS
BODY MASS INDEX: 27.38 KG/M2 | SYSTOLIC BLOOD PRESSURE: 113 MMHG | HEART RATE: 57 BPM | RESPIRATION RATE: 18 BRPM | HEIGHT: 71 IN | DIASTOLIC BLOOD PRESSURE: 69 MMHG | TEMPERATURE: 98.1 F | OXYGEN SATURATION: 97 % | WEIGHT: 195.6 LBS

## 2024-12-10 DIAGNOSIS — E11.41 TYPE 2 DIABETES MELLITUS WITH DIABETIC MONONEUROPATHY, WITHOUT LONG-TERM CURRENT USE OF INSULIN (HCC): Primary | ICD-10-CM

## 2024-12-10 DIAGNOSIS — M25.562 ACUTE PAIN OF LEFT KNEE: ICD-10-CM

## 2024-12-10 DIAGNOSIS — M25.522 ELBOW PAIN, LEFT: ICD-10-CM

## 2024-12-10 LAB — HBA1C MFR BLD: 8.7 %

## 2024-12-10 PROCEDURE — 99214 OFFICE O/P EST MOD 30 MIN: CPT | Performed by: FAMILY MEDICINE

## 2024-12-10 PROCEDURE — 3074F SYST BP LT 130 MM HG: CPT | Performed by: FAMILY MEDICINE

## 2024-12-10 PROCEDURE — 1159F MED LIST DOCD IN RCRD: CPT | Performed by: FAMILY MEDICINE

## 2024-12-10 PROCEDURE — 83036 HEMOGLOBIN GLYCOSYLATED A1C: CPT | Performed by: FAMILY MEDICINE

## 2024-12-10 PROCEDURE — 1123F ACP DISCUSS/DSCN MKR DOCD: CPT | Performed by: FAMILY MEDICINE

## 2024-12-10 PROCEDURE — 3046F HEMOGLOBIN A1C LEVEL >9.0%: CPT | Performed by: FAMILY MEDICINE

## 2024-12-10 PROCEDURE — 2022F DILAT RTA XM EVC RTNOPTHY: CPT | Performed by: FAMILY MEDICINE

## 2024-12-10 PROCEDURE — G8419 CALC BMI OUT NRM PARAM NOF/U: HCPCS | Performed by: FAMILY MEDICINE

## 2024-12-10 PROCEDURE — 3078F DIAST BP <80 MM HG: CPT | Performed by: FAMILY MEDICINE

## 2024-12-10 PROCEDURE — 1125F AMNT PAIN NOTED PAIN PRSNT: CPT | Performed by: FAMILY MEDICINE

## 2024-12-10 PROCEDURE — G8427 DOCREV CUR MEDS BY ELIG CLIN: HCPCS | Performed by: FAMILY MEDICINE

## 2024-12-10 PROCEDURE — PBSHW AMB POC HEMOGLOBIN A1C: Performed by: FAMILY MEDICINE

## 2024-12-10 PROCEDURE — G8484 FLU IMMUNIZE NO ADMIN: HCPCS | Performed by: FAMILY MEDICINE

## 2024-12-10 PROCEDURE — 1036F TOBACCO NON-USER: CPT | Performed by: FAMILY MEDICINE

## 2024-12-10 PROCEDURE — 3017F COLORECTAL CA SCREEN DOC REV: CPT | Performed by: FAMILY MEDICINE

## 2024-12-10 SDOH — ECONOMIC STABILITY: FOOD INSECURITY: WITHIN THE PAST 12 MONTHS, YOU WORRIED THAT YOUR FOOD WOULD RUN OUT BEFORE YOU GOT MONEY TO BUY MORE.: NEVER TRUE

## 2024-12-10 SDOH — ECONOMIC STABILITY: FOOD INSECURITY: WITHIN THE PAST 12 MONTHS, THE FOOD YOU BOUGHT JUST DIDN'T LAST AND YOU DIDN'T HAVE MONEY TO GET MORE.: NEVER TRUE

## 2024-12-10 SDOH — ECONOMIC STABILITY: INCOME INSECURITY: HOW HARD IS IT FOR YOU TO PAY FOR THE VERY BASICS LIKE FOOD, HOUSING, MEDICAL CARE, AND HEATING?: NOT HARD AT ALL

## 2024-12-10 NOTE — PROGRESS NOTES
HPI  Jimenez Garcia is a 69 y.o. male who Presents to follow-up on diabetes    PMHx:  Past Medical History:   Diagnosis Date    Diabetes (HCC)     Hypertension        Meds:   Current Outpatient Medications   Medication Sig Dispense Refill    tamsulosin (FLOMAX) 0.4 MG capsule Take 1 capsule by mouth daily 90 capsule 3    Continuous Glucose  (FREESTYLE MIRANDA 2 READER) YUMIKO Check blood sugar continuously E11.9, Z79.4 1 each 0    Continuous Glucose Sensor (FREESTYLE MIRANDA 2 SENSOR) MISC Check blood sugar continuously E11.9, Z79.4 2 each 11    JARDIANCE 25 MG tablet TAKE 1 TABLET BY MOUTH DAILY 90 tablet 3    metFORMIN (GLUCOPHAGE-XR) 500 MG extended release tablet Take 1 tablet by mouth 2 times daily 180 tablet 3    atorvastatin (LIPITOR) 20 MG tablet Take 1 tablet by mouth daily 90 tablet 3    glipiZIDE (GLUCOTROL XL) 10 MG extended release tablet Take 1 tablet by mouth daily 90 tablet 3    lisinopril-hydroCHLOROthiazide (PRINZIDE;ZESTORETIC) 10-12.5 MG per tablet Take 1 tablet by mouth daily 90 tablet 3    blood glucose test strips (ONETOUCH VERIO) strip 1 each by In Vitro route daily As needed. 100 each 3    blood glucose monitor strips Test 1 time a day. E11.9 100 strip 3    blood glucose monitor kit and supplies Test blood sugar once daily. Dispense all needed supplies to include: monitor, strips, lancing device, lancets, control solutions, alcohol swabs. E11.9 1 kit 0    aspirin 81 MG chewable tablet Take 1 tablet by mouth daily      Cinnamon 500 MG CAPS Take 1 capsule by mouth daily       No current facility-administered medications for this visit.       Allergies:   Allergies   Allergen Reactions    Nsaids Other (See Comments)    Penicillins Other (See Comments)       Smoker:  Social History     Tobacco Use   Smoking Status Never   Smokeless Tobacco Never       ETOH:   Social History     Substance and Sexual Activity   Alcohol Use Yes       FH:   Family History   Problem Relation Age of Onset    Lupus

## 2024-12-10 NOTE — PROGRESS NOTES
Chief Complaint   Patient presents with    Diabetes        Vitals:    12/10/24 1006   BP: 113/69   Pulse: 57   Resp: 18   Temp: 98.1 °F (36.7 °C)   SpO2: 97%       Health Maintenance Due   Topic Date Due    Pneumococcal 65+ years Vaccine (1 of 2 - PCV) Never done    Diabetic Alb to Cr ratio (uACR) test  Never done    Shingles vaccine (1 of 2) Never done    Diabetic retinal exam  07/21/2021    Diabetic foot exam  03/15/2022    COVID-19 Vaccine (4 - 2023-24 season) 09/01/2024    A1C test (Diabetic or Prediabetic)  12/10/2024        \"Have you been to the ER, urgent care clinic since your last visit?  Hospitalized since your last visit?\"    NO    “Have you seen or consulted any other health care providers outside our system since your last visit?”    NO

## 2024-12-14 LAB — FRUCTOSAMINE SERPL-SCNC: 319 UMOL/L (ref 0–285)

## 2025-03-10 ENCOUNTER — OFFICE VISIT (OUTPATIENT)
Age: 70
End: 2025-03-10
Payer: MEDICARE

## 2025-03-10 DIAGNOSIS — E11.41 TYPE 2 DIABETES MELLITUS WITH DIABETIC MONONEUROPATHY, WITHOUT LONG-TERM CURRENT USE OF INSULIN (HCC): Primary | ICD-10-CM

## 2025-03-10 PROCEDURE — G0108 DIAB MANAGE TRN  PER INDIV: HCPCS

## 2025-03-10 NOTE — PROGRESS NOTES
[facility]  Diabetes Self-Management Education & Support Program    Reason for Referral: T2DM  Referral Source: Darwin Fu MD  Services requested: DSMES, MNT       ASSESSMENT    From my perspective, the participant would benefit from DSMES specifically related to reducing risks, healthy eating, monitoring, taking medications, physical activity, healthy coping, and problem solving. Will adapt DSMES program to build on participant's skills score, confidence score, and preparedness score as noted in the Diabetes Skills, Confidence, and Preparedness Index.    During the program, we will focus on providing DSMES that specifically addresses participant's interest in healthy eating, as shown by their reported readiness to change.    The participant would be best served by attending weekly group class series.    Diabetes Self-Management Education Follow-up Visit: 4-1-2025       Clinical Presentation  Jimenez Garcia is a 69 y.o. White male referred for diabetes self-management education. Participant has Type 2 DM not on insulin for 11-20 years. Family history negative for diabetes. Patient reports not receiving DSMES services in the past. Most recent A1c value:   Lab Results   Component Value Date/Time    GBO6IOVN 8.7 12/10/2024 10:36 AM    LABA1C 8.6 02/21/2022 11:34 AM     Diabetes-related medical history:  Neurological complications  Mononeuropathy     Diabetes-related medications:  Current dosing: glipiZIDE - 10 MG, 1 tab daily                           metFORMIN XR - 500 MG, 1 tab twice daily                           Jardiance 25 mg, 1 tab daily    Blood Pressure Management  lisinopril-hydroCHLOROthiazide - 10-12.5 MG      Lipid Management  atorvastatin - 20 MG      Clot Prevention  This patient does not have an active medication from one of the medication groupers.    Learning Assessment  Learning objectives Educator assessment (3/10/2025)   Diabetes Disease Process  The participant

## 2025-04-01 ENCOUNTER — CLINICAL SUPPORT (OUTPATIENT)
Age: 70
End: 2025-04-01
Payer: MEDICARE

## 2025-04-01 DIAGNOSIS — E11.41 TYPE 2 DIABETES MELLITUS WITH DIABETIC MONONEUROPATHY, WITHOUT LONG-TERM CURRENT USE OF INSULIN (HCC): Primary | ICD-10-CM

## 2025-04-01 PROCEDURE — G0109 DIAB MANAGE TRN IND/GROUP: HCPCS

## 2025-04-01 NOTE — PROGRESS NOTES
[facility]  Diabetes Self-Management Education & Support Program  Encounter Note      SUMMARY  Diabetes self-care management training was completed related to reducing risks. The participant will return on April 08 to continue DSMES related to healthy eating and monitoring. The participant did not identify SMART Goal(s) and will practice knowledge and skills related to reducing risks to improve diabetes self-management.      EVALUATION:  Mr. Garcia verbalized understanding of diabetes disease process and recommended targets for A1c, BP, BG, and LDL cholesterol. He verbalized understanding of the role of vaccinations, eye/dental/foot exams in preventing DM complications and the relationships between DM and heart/kidney/nerve/sleep health. He verbalized understanding the importance of establishing and maintaining a personal health care record.      RECOMMENDATIONS:  Communicate with provider regarding recommended vaccines and exams.  Consider setting a SMART goal.  Establish and maintain a personal health care record.       TOPICS DISCUSSED TODAY:  WHAT IS DIABETES? Minutes: 60  HOW DO I STAY HEALTHY? 60    Next provider visit is scheduled for   Future Appointments         Provider Specialty Dept Phone    4/8/2025 9:00 AM DIABETES GROUP CLASS [facility] [phone]    4/15/2025 9:00 AM DIABETES GROUP CLASS [facility] [phone]    4/22/2025 9:00 AM DIABETES GROUP CLASS [facility] [phone]    5/2/2025 10:20 AM Darwin Fu MD Family Medicine [phone]             DATE DSMES TOPIC EVALUATION     4/1/2025 WHAT IS DIABETES?   Role of the normal pancreas in energy balance and blood glucose control   The defect seen in diabetes   Signs & symptoms of diabetes   Diagnosis of diabetes   Types of diabetes   Blood glucose targets in adults       The participant knows  Their type of diabetes: Yes   The basic physiologic defect:

## 2025-04-08 ENCOUNTER — CLINICAL SUPPORT (OUTPATIENT)
Age: 70
End: 2025-04-08
Payer: MEDICARE

## 2025-04-08 DIAGNOSIS — E11.41 TYPE 2 DIABETES MELLITUS WITH DIABETIC MONONEUROPATHY, WITHOUT LONG-TERM CURRENT USE OF INSULIN (HCC): Primary | ICD-10-CM

## 2025-04-08 PROCEDURE — G0109 DIAB MANAGE TRN IND/GROUP: HCPCS

## 2025-04-08 NOTE — PROGRESS NOTES
personally reviewed the health record, including provider notes, laboratory data and current medications before making these care and education recommendations. The time spent in this effort is included in the total time.  Total minutes: 120

## 2025-04-22 ENCOUNTER — CLINICAL SUPPORT (OUTPATIENT)
Age: 70
End: 2025-04-22
Payer: MEDICARE

## 2025-04-22 DIAGNOSIS — E11.41 TYPE 2 DIABETES MELLITUS WITH DIABETIC MONONEUROPATHY, WITHOUT LONG-TERM CURRENT USE OF INSULIN (HCC): Primary | ICD-10-CM

## 2025-04-22 PROCEDURE — G0109 DIAB MANAGE TRN IND/GROUP: HCPCS

## 2025-04-29 ENCOUNTER — CLINICAL SUPPORT (OUTPATIENT)
Age: 70
End: 2025-04-29
Payer: MEDICARE

## 2025-04-29 DIAGNOSIS — E11.41 TYPE 2 DIABETES MELLITUS WITH DIABETIC MONONEUROPATHY, WITHOUT LONG-TERM CURRENT USE OF INSULIN (HCC): Primary | ICD-10-CM

## 2025-04-29 PROCEDURE — G0109 DIAB MANAGE TRN IND/GROUP: HCPCS

## 2025-04-29 NOTE — PROGRESS NOTES
[facility]  Diabetes Self-Management Education & Support Program  Encounter Note      SUMMARY  Diabetes self-care management training was completed related to healthy coping and problem solving. The participant will return on June 17 to complete DSMES post assessment. The participant will practice knowledge and skills related to healthy coping and problem solving to improve diabetes self-management.      EVALUATION:  Mr. Garcia verbalized understanding and recognition of various problem solving techniques, glucose patterns, and trends. He can verbalize understanding of hypoglycemia protocols, Rule of 15, sick day management, and emergency preparedness planning. Encouraged participant to regularly practice stress management techniques. He shared that he enjoys painting cars and continues to work on counting carbs and healthy eating recommendations.     RECOMMENDATIONS:  May benefit from practicing problem solving and stress management techniques.   Establish an emergency plan/kit.      TOPICS DISCUSSED TODAY:  HOW DO I FIND SUPPORT TO TACKLE THIS CONDITION? 60  HOW DO I FIGURE OUT WHAT'S INFLUENCING MY BLOOD GLUCOSES? 60    Next provider visit is scheduled for   Future Appointments         Provider Specialty Dept Phone    5/16/2025 9:20 AM Darwin Fu MD Family Medicine [phone]    6/17/2025 1:00 PM Lynnette Dubon RN Diabetes Services [phone]             SMART GOAL(S)  Practice healthy eating self-care behavior by counting carbs for 1 meal, 2-3 times over the next week.   ACHIEVEMENT OF GOAL(S) : %       DATE DSMES TOPIC EVALUATION     4/29/2025 HOW DO I FIND SUPPORT TO TACKLE THIS CONDITION?   Normal responses to diabetes diagnosis or complication   Shock   Anger & resentment   Guilt/self-blame   Sadness & worry   Depression    Anxiety   Constructive strategies to normal responses    Exploring feelings & attitudes   Motivation: Cost versus benefits analysis   Obtaining

## 2025-05-19 RX ORDER — ATORVASTATIN CALCIUM 20 MG/1
20 TABLET, FILM COATED ORAL DAILY
Qty: 90 TABLET | Refills: 1 | Status: SHIPPED | OUTPATIENT
Start: 2025-05-19

## 2025-05-19 RX ORDER — LISINOPRIL AND HYDROCHLOROTHIAZIDE 10; 12.5 MG/1; MG/1
1 TABLET ORAL DAILY
Qty: 90 TABLET | Refills: 1 | Status: SHIPPED | OUTPATIENT
Start: 2025-05-19

## 2025-05-19 RX ORDER — GLIPIZIDE 10 MG/1
10 TABLET, FILM COATED, EXTENDED RELEASE ORAL DAILY
Qty: 90 TABLET | Refills: 1 | Status: SHIPPED | OUTPATIENT
Start: 2025-05-19

## 2025-05-27 ENCOUNTER — TELEPHONE (OUTPATIENT)
Age: 70
End: 2025-05-27

## 2025-05-27 DIAGNOSIS — I10 ESSENTIAL HYPERTENSION: ICD-10-CM

## 2025-05-27 DIAGNOSIS — E11.41 TYPE 2 DIABETES MELLITUS WITH DIABETIC MONONEUROPATHY, WITHOUT LONG-TERM CURRENT USE OF INSULIN (HCC): Primary | ICD-10-CM

## 2025-05-27 DIAGNOSIS — E78.2 MIXED HYPERLIPIDEMIA: ICD-10-CM

## 2025-05-27 DIAGNOSIS — E11.41 TYPE 2 DIABETES MELLITUS WITH DIABETIC MONONEUROPATHY, WITHOUT LONG-TERM CURRENT USE OF INSULIN (HCC): ICD-10-CM

## 2025-05-27 NOTE — TELEPHONE ENCOUNTER
Patient requesting lab orders be faxed over to [facility] in [place]. He will be going tomorrow to complete his labs.  [phone].

## 2025-05-28 LAB
BASOPHILS # BLD AUTO: 0 X10E3/UL (ref 0–0.2)
BASOPHILS NFR BLD AUTO: 1 %
EOSINOPHIL # BLD AUTO: 0.2 X10E3/UL (ref 0–0.4)
EOSINOPHIL NFR BLD AUTO: 3 %
ERYTHROCYTE [DISTWIDTH] IN BLOOD BY AUTOMATED COUNT: 13.8 % (ref 11.6–15.4)
HCT VFR BLD AUTO: 47.6 % (ref 37.5–51)
HGB BLD-MCNC: 15.4 G/DL (ref 13–17.7)
IMM GRANULOCYTES # BLD AUTO: 0 X10E3/UL (ref 0–0.1)
IMM GRANULOCYTES NFR BLD AUTO: 0 %
LYMPHOCYTES # BLD AUTO: 2.7 X10E3/UL (ref 0.7–3.1)
LYMPHOCYTES NFR BLD AUTO: 42 %
MCH RBC QN AUTO: 28.5 PG (ref 26.6–33)
MCHC RBC AUTO-ENTMCNC: 32.4 G/DL (ref 31.5–35.7)
MCV RBC AUTO: 88 FL (ref 79–97)
MONOCYTES # BLD AUTO: 0.6 X10E3/UL (ref 0.1–0.9)
MONOCYTES NFR BLD AUTO: 10 %
NEUTROPHILS # BLD AUTO: 2.9 X10E3/UL (ref 1.4–7)
NEUTROPHILS NFR BLD AUTO: 44 %
PLATELET # BLD AUTO: 221 X10E3/UL (ref 150–450)
RBC # BLD AUTO: 5.4 X10E6/UL (ref 4.14–5.8)
WBC # BLD AUTO: 6.4 X10E3/UL (ref 3.4–10.8)

## 2025-05-29 ENCOUNTER — RESULTS FOLLOW-UP (OUTPATIENT)
Age: 70
End: 2025-05-29

## 2025-05-29 LAB
ALBUMIN SERPL-MCNC: 4.4 G/DL (ref 3.9–4.9)
ALP SERPL-CCNC: 82 IU/L (ref 44–121)
ALT SERPL-CCNC: 23 IU/L (ref 0–44)
AST SERPL-CCNC: 17 IU/L (ref 0–40)
BILIRUB SERPL-MCNC: 0.4 MG/DL (ref 0–1.2)
BUN SERPL-MCNC: 14 MG/DL (ref 8–27)
BUN/CREAT SERPL: 16 (ref 10–24)
CALCIUM SERPL-MCNC: 9.3 MG/DL (ref 8.6–10.2)
CHLORIDE SERPL-SCNC: 103 MMOL/L (ref 96–106)
CHOLEST SERPL-MCNC: 110 MG/DL (ref 100–199)
CO2 SERPL-SCNC: 21 MMOL/L (ref 20–29)
CREAT SERPL-MCNC: 0.88 MG/DL (ref 0.76–1.27)
EGFRCR SERPLBLD CKD-EPI 2021: 94 ML/MIN/1.73
FRUCTOSAMINE SERPL-SCNC: 321 UMOL/L (ref 0–285)
GLOBULIN SER CALC-MCNC: 2.5 G/DL (ref 1.5–4.5)
GLUCOSE SERPL-MCNC: 105 MG/DL (ref 70–99)
HBA1C MFR BLD: 9.4 % (ref 4.8–5.6)
HDLC SERPL-MCNC: 43 MG/DL
IMP & REVIEW OF LAB RESULTS: NORMAL
LDLC SERPL CALC-MCNC: 44 MG/DL (ref 0–99)
Lab: NORMAL
POTASSIUM SERPL-SCNC: 4.3 MMOL/L (ref 3.5–5.2)
PROT SERPL-MCNC: 6.9 G/DL (ref 6–8.5)
SODIUM SERPL-SCNC: 140 MMOL/L (ref 134–144)
TRIGL SERPL-MCNC: 130 MG/DL (ref 0–149)
VLDLC SERPL CALC-MCNC: 23 MG/DL (ref 5–40)

## 2025-05-30 ENCOUNTER — OFFICE VISIT (OUTPATIENT)
Age: 70
End: 2025-05-30
Payer: MEDICARE

## 2025-05-30 VITALS
TEMPERATURE: 98.1 F | SYSTOLIC BLOOD PRESSURE: 110 MMHG | DIASTOLIC BLOOD PRESSURE: 68 MMHG | RESPIRATION RATE: 17 BRPM | HEIGHT: 71 IN | HEART RATE: 63 BPM | OXYGEN SATURATION: 91 % | WEIGHT: 195 LBS | BODY MASS INDEX: 27.3 KG/M2

## 2025-05-30 DIAGNOSIS — E11.41 TYPE 2 DIABETES MELLITUS WITH DIABETIC MONONEUROPATHY, WITHOUT LONG-TERM CURRENT USE OF INSULIN (HCC): Primary | ICD-10-CM

## 2025-05-30 PROCEDURE — 1126F AMNT PAIN NOTED NONE PRSNT: CPT | Performed by: FAMILY MEDICINE

## 2025-05-30 PROCEDURE — 99214 OFFICE O/P EST MOD 30 MIN: CPT | Performed by: FAMILY MEDICINE

## 2025-05-30 PROCEDURE — 1159F MED LIST DOCD IN RCRD: CPT | Performed by: FAMILY MEDICINE

## 2025-05-30 PROCEDURE — 3078F DIAST BP <80 MM HG: CPT | Performed by: FAMILY MEDICINE

## 2025-05-30 PROCEDURE — 1123F ACP DISCUSS/DSCN MKR DOCD: CPT | Performed by: FAMILY MEDICINE

## 2025-05-30 PROCEDURE — 3017F COLORECTAL CA SCREEN DOC REV: CPT | Performed by: FAMILY MEDICINE

## 2025-05-30 PROCEDURE — 3046F HEMOGLOBIN A1C LEVEL >9.0%: CPT | Performed by: FAMILY MEDICINE

## 2025-05-30 PROCEDURE — G8427 DOCREV CUR MEDS BY ELIG CLIN: HCPCS | Performed by: FAMILY MEDICINE

## 2025-05-30 PROCEDURE — 1036F TOBACCO NON-USER: CPT | Performed by: FAMILY MEDICINE

## 2025-05-30 PROCEDURE — G8419 CALC BMI OUT NRM PARAM NOF/U: HCPCS | Performed by: FAMILY MEDICINE

## 2025-05-30 PROCEDURE — 3074F SYST BP LT 130 MM HG: CPT | Performed by: FAMILY MEDICINE

## 2025-05-30 PROCEDURE — 2022F DILAT RTA XM EVC RTNOPTHY: CPT | Performed by: FAMILY MEDICINE

## 2025-05-30 RX ORDER — METFORMIN HYDROCHLORIDE 500 MG/1
500 TABLET, EXTENDED RELEASE ORAL 2 TIMES DAILY
Qty: 180 TABLET | Refills: 3 | Status: SHIPPED | OUTPATIENT
Start: 2025-05-30

## 2025-05-30 SDOH — ECONOMIC STABILITY: FOOD INSECURITY: WITHIN THE PAST 12 MONTHS, THE FOOD YOU BOUGHT JUST DIDN'T LAST AND YOU DIDN'T HAVE MONEY TO GET MORE.: NEVER TRUE

## 2025-05-30 SDOH — ECONOMIC STABILITY: FOOD INSECURITY: WITHIN THE PAST 12 MONTHS, YOU WORRIED THAT YOUR FOOD WOULD RUN OUT BEFORE YOU GOT MONEY TO BUY MORE.: NEVER TRUE

## 2025-05-30 ASSESSMENT — PATIENT HEALTH QUESTIONNAIRE - PHQ9
SUM OF ALL RESPONSES TO PHQ QUESTIONS 1-9: 0
2. FEELING DOWN, DEPRESSED OR HOPELESS: NOT AT ALL
SUM OF ALL RESPONSES TO PHQ QUESTIONS 1-9: 0
1. LITTLE INTEREST OR PLEASURE IN DOING THINGS: NOT AT ALL
SUM OF ALL RESPONSES TO PHQ QUESTIONS 1-9: 0
SUM OF ALL RESPONSES TO PHQ QUESTIONS 1-9: 0

## 2025-05-30 NOTE — PROGRESS NOTES
HPI  Jimenez Garcia is a 69 y.o. male who Presents to follow-up on diabetes    PMHx:  Past Medical History:   Diagnosis Date    Diabetes (HCC)     Hypertension        Meds:   Current Outpatient Medications   Medication Sig Dispense Refill    metFORMIN (GLUCOPHAGE-XR) 500 MG extended release tablet Take 1 tablet by mouth 2 times daily 180 tablet 3    empagliflozin (JARDIANCE) 25 MG tablet Take 1 tablet by mouth daily 90 tablet 3    lisinopril-hydroCHLOROthiazide (PRINZIDE;ZESTORETIC) 10-12.5 MG per tablet TAKE 1 TABLET BY MOUTH DAILY 90 tablet 1    glipiZIDE (GLUCOTROL XL) 10 MG extended release tablet TAKE 1 TABLET BY MOUTH DAILY 90 tablet 1    atorvastatin (LIPITOR) 20 MG tablet TAKE 1 TABLET BY MOUTH DAILY 90 tablet 1    blood glucose test strips (ONETOUCH VERIO) strip 1 each by In Vitro route daily As needed. 100 each 3    blood glucose monitor strips Test 1 time a day. E11.9 100 strip 3    blood glucose monitor kit and supplies Test blood sugar once daily. Dispense all needed supplies to include: monitor, strips, lancing device, lancets, control solutions, alcohol swabs. E11.9 1 kit 0    aspirin 81 MG chewable tablet Take 1 tablet by mouth daily      Cinnamon 500 MG CAPS Take 1 capsule by mouth daily       No current facility-administered medications for this visit.       Allergies:   Allergies   Allergen Reactions    Nsaids Other (See Comments)    Penicillins Other (See Comments)       Smoker:  Social History     Tobacco Use   Smoking Status Never   Smokeless Tobacco Never       ETOH:   Social History     Substance and Sexual Activity   Alcohol Use Yes       FH:   Family History   Problem Relation Age of Onset    Lupus Mother        ROS:   As listed in HPI. In addition:  Constitutional:   No headache, fever, fatigue, weight loss or weight gain      Cardiac:    No chest pain      Resp:   No cough or shortness of breath      Neuro   No loss of consciousness, dizziness, seizures      Physical Exam:  Blood pressure

## 2025-05-30 NOTE — PROGRESS NOTES
Health Maintenance Due   Topic Date Due    Diabetic Alb to Cr ratio (uACR) test  Never done    Pneumococcal 50+ years Vaccine (1 of 2 - PCV) Never done    Shingles vaccine (1 of 2) Never done    Respiratory Syncytial Virus (RSV) Pregnant or age 60 yrs+ (1 - Risk 60-74 years 1-dose series) Never done    Diabetic retinal exam  07/21/2021    Diabetic foot exam  03/15/2022    COVID-19 Vaccine (4 - 2024-25 season) 09/01/2024    Depression Screen  06/11/2025     No recent eye/foot exam.

## 2025-05-31 LAB
CREAT UR-MCNC: 25.5 MG/DL
MICROALBUMIN UR-MCNC: 0.57 MG/DL
MICROALBUMIN/CREAT UR-RTO: 22 MG/G (ref 0–30)

## 2025-06-05 ENCOUNTER — RESULTS FOLLOW-UP (OUTPATIENT)
Age: 70
End: 2025-06-05

## 2025-06-17 ENCOUNTER — TELEMEDICINE (OUTPATIENT)
Age: 70
End: 2025-06-17
Payer: MEDICARE

## 2025-06-17 DIAGNOSIS — E11.41 TYPE 2 DIABETES MELLITUS WITH DIABETIC MONONEUROPATHY, WITHOUT LONG-TERM CURRENT USE OF INSULIN (HCC): Primary | ICD-10-CM

## 2025-06-17 PROCEDURE — G0108 DIAB MANAGE TRN  PER INDIV: HCPCS

## 2025-06-17 NOTE — PROGRESS NOTES
hypoglycemia: consistent meals/snack time    The participant knows how to treat hypoglycemia: Rule of 15    Hyperglycemia Management:  The participant knows their signs and symptoms of hyperglycemia: Pt is aware of common s/s of hyperglycemia    The participant knows how to prevent hyperglycemia: take medications as instructed and focus on carbohydrate counting/meal planning    Sick Day Management:  The participant knows what to do differently on sick days: Stay hydrated, Take diabetes medication as instructed by provider, Take over-the-counter medications as instructed by provider    Pattern Management:  The participant can notice blood glucose patterns when looking at their blood glucose readings: Yes    How do you use the blood glucose readings from your meter or logbook: understand how body responds to meals      Note: Content derived from the American Association of Diabetes Educators' Diabetes Education Curriculum: A Guide to Successful Self-Management (3rd edition)      I have personally reviewed the health record, including provider notes, laboratory data and current medications before making these care and education recommendations. The time spent in this effort is included in the total time.  Total minutes: 30    On this date 6/17/2025 I have spent 30 minutes reviewing previous notes, test results, and other pertinent medical information with the patient, discussing the diagnosis and importance of compliance with the treatment plan as well as documenting on the day of the visit.    Jimenez Garcia was evaluated through a synchronous (real-time) audio encounter. Patient identification was verified at the start of the visit. He (or guardian if applicable) is aware that this is a billable service, which includes applicable co-pays. This visit was conducted with the patient's (and/or legal guardian's) verbal consent. He has not had a related appointment within my department in the past 7 days or scheduled within